# Patient Record
Sex: FEMALE | Race: WHITE | Employment: UNEMPLOYED | URBAN - METROPOLITAN AREA
[De-identification: names, ages, dates, MRNs, and addresses within clinical notes are randomized per-mention and may not be internally consistent; named-entity substitution may affect disease eponyms.]

---

## 2018-12-17 ENCOUNTER — HOSPITAL ENCOUNTER (EMERGENCY)
Facility: CLINIC | Age: 33
Discharge: HOME | End: 2018-12-17
Attending: EMERGENCY MEDICINE | Admitting: EMERGENCY MEDICINE

## 2018-12-17 ENCOUNTER — APPOINTMENT (OUTPATIENT)
Dept: GENERAL RADIOLOGY | Facility: CLINIC | Age: 33
End: 2018-12-17
Attending: EMERGENCY MEDICINE

## 2018-12-17 VITALS
OXYGEN SATURATION: 98 % | RESPIRATION RATE: 16 BRPM | WEIGHT: 293 LBS | SYSTOLIC BLOOD PRESSURE: 136 MMHG | TEMPERATURE: 99.1 F | DIASTOLIC BLOOD PRESSURE: 85 MMHG

## 2018-12-17 DIAGNOSIS — R07.9 CHEST PAIN, UNSPECIFIED TYPE: ICD-10-CM

## 2018-12-17 LAB
ANION GAP SERPL CALCULATED.3IONS-SCNC: 10 MMOL/L (ref 3–14)
B-HCG FREE SERPL-ACNC: <5 IU/L
BUN SERPL-MCNC: 9 MG/DL (ref 7–30)
CALCIUM SERPL-MCNC: 8.4 MG/DL (ref 8.5–10.1)
CHLORIDE SERPL-SCNC: 104 MMOL/L (ref 94–109)
CO2 SERPL-SCNC: 26 MMOL/L (ref 20–32)
CREAT SERPL-MCNC: 0.57 MG/DL (ref 0.52–1.04)
ERYTHROCYTE [DISTWIDTH] IN BLOOD BY AUTOMATED COUNT: 17 % (ref 10–15)
GFR SERPL CREATININE-BSD FRML MDRD: >90 ML/MIN/1.7M2
GLUCOSE SERPL-MCNC: 109 MG/DL (ref 70–99)
HCT VFR BLD AUTO: 43.4 % (ref 35–47)
HGB BLD-MCNC: 12.7 G/DL (ref 11.7–15.7)
MCH RBC QN AUTO: 24.7 PG (ref 26.5–33)
MCHC RBC AUTO-ENTMCNC: 29.3 G/DL (ref 31.5–36.5)
MCV RBC AUTO: 84 FL (ref 78–100)
PLATELET # BLD AUTO: 402 10E9/L (ref 150–450)
POTASSIUM SERPL-SCNC: 3.6 MMOL/L (ref 3.4–5.3)
RBC # BLD AUTO: 5.15 10E12/L (ref 3.8–5.2)
SODIUM SERPL-SCNC: 140 MMOL/L (ref 133–144)
TROPONIN I SERPL-MCNC: <0.015 UG/L (ref 0–0.04)
WBC # BLD AUTO: 13 10E9/L (ref 4–11)

## 2018-12-17 PROCEDURE — 85027 COMPLETE CBC AUTOMATED: CPT | Performed by: EMERGENCY MEDICINE

## 2018-12-17 PROCEDURE — 84702 CHORIONIC GONADOTROPIN TEST: CPT

## 2018-12-17 PROCEDURE — 93005 ELECTROCARDIOGRAM TRACING: CPT

## 2018-12-17 PROCEDURE — 25000128 H RX IP 250 OP 636: Performed by: EMERGENCY MEDICINE

## 2018-12-17 PROCEDURE — 96374 THER/PROPH/DIAG INJ IV PUSH: CPT

## 2018-12-17 PROCEDURE — 99285 EMERGENCY DEPT VISIT HI MDM: CPT | Mod: 25

## 2018-12-17 PROCEDURE — 84484 ASSAY OF TROPONIN QUANT: CPT | Performed by: EMERGENCY MEDICINE

## 2018-12-17 PROCEDURE — 80048 BASIC METABOLIC PNL TOTAL CA: CPT | Performed by: EMERGENCY MEDICINE

## 2018-12-17 PROCEDURE — 71046 X-RAY EXAM CHEST 2 VIEWS: CPT

## 2018-12-17 RX ORDER — TOPIRAMATE SPINKLE 25 MG/1
25 CAPSULE ORAL 2 TIMES DAILY
Status: ON HOLD | COMMUNITY
End: 2019-07-14

## 2018-12-17 RX ORDER — LORAZEPAM 2 MG/ML
1 INJECTION INTRAMUSCULAR ONCE
Status: COMPLETED | OUTPATIENT
Start: 2018-12-17 | End: 2018-12-17

## 2018-12-17 RX ORDER — LORAZEPAM 1 MG/1
0.5 TABLET ORAL EVERY 6 HOURS PRN
Qty: 6 TABLET | Refills: 0 | Status: ON HOLD | OUTPATIENT
Start: 2018-12-17 | End: 2019-07-14

## 2018-12-17 RX ADMIN — LORAZEPAM 1 MG: 2 INJECTION, SOLUTION INTRAMUSCULAR; INTRAVENOUS at 16:23

## 2018-12-17 SDOH — HEALTH STABILITY: MENTAL HEALTH: HOW OFTEN DO YOU HAVE A DRINK CONTAINING ALCOHOL?: NEVER

## 2018-12-17 ASSESSMENT — ENCOUNTER SYMPTOMS
VOMITING: 0
NAUSEA: 1
SHORTNESS OF BREATH: 1
NERVOUS/ANXIOUS: 1

## 2018-12-17 NOTE — ED TRIAGE NOTES
Patient to ED with c/o CP. Pain is sharp in nature and started at 0300 today. Patient reports that she had an urge to cry and felt anxious before she had the chest pain.  translation phone line used during triage to obtain information.

## 2018-12-17 NOTE — ED AVS SNAPSHOT
[facility] Emergency Department  [address]  [facility] [address]  Phone:  [phone]  Fax:  [phone]                                    Memo Cordova   MRN: [id number]    Department:  [facility] Emergency Department   Date of Visit:  12/17/2018           After Visit Summary Signature Page    I have received my discharge instructions, and my questions have been answered. I have discussed any challenges I see with this plan with the nurse or doctor.    ..........................................................................................................................................  Patient/Patient Representative Signature      ..........................................................................................................................................  Patient Representative Print Name and Relationship to Patient    ..................................................               ................................................  Date                                   Time    ..........................................................................................................................................  Reviewed by Signature/Title    ...................................................              ..............................................  Date                                               Time          22EPIC Rev 08/18

## 2018-12-17 NOTE — ED PROVIDER NOTES
History     Chief Complaint:  Chest Pain    HPI The history is obtained through the [place] language interpretation service.     Memo Cordova is a 33 year old female who presents for evaluation of chest pain. For about the last day, the patient has been feeling very anxious about some family issues and has been having episodes of crying due to her anxiety. This morning around 1000 shortly after having a crying spell, the patient started to develop sharp left-sided chest pain. This chest pain has been present constantly since onset and does not radiate elsewhere. She cannot identify any exacerbating or alleviating factors for this pain. Shortly after developing this chest pain, the patient felt somewhat nauseous and short of breath, but both these symptoms have since resolved, and she has not had any vomiting. Due to this episode of chest pain, the patient came into the ED for evaluation. Currently in the ED, the patient rates her pain at a severity of 6/10. She has not had any recent leg swelling, has no personal or family history of blood clots, and has not traveled or had surgery recently. She does not have a therapist or psychiatrist. She denies any suicidal ideation.     Cardiac Risk Factors:  CAD:    Negative   Hypertension:   Negative   Hyperlipidemia:  Negative   Diabetes:   Negative   Tobacco use:   Negative   Gender:   Female   Age:    33  Familial Hx of CAD:  Negative      PE/DVT Risk Factors:   Hx of PE/DVT:   Negative   Hx of clotting disorder:  Negative   Hx of cancer:    Negative   Tobacco use:    Negative   Hormone therapy:   Negative   Pregnancy:    Negative   Prolonged immobilization:  Negative   Recent surgery:   Negative   Recent travel:    Negative   Familial Hx of PE/DVT:  Negative      Allergies:  NKDA      Medications:    Topamax      Past Medical History:    Uncomplicated asthma     Past Surgical History:    Cholecystectomy     Family History:    History reviewed. No pertinent  family history.     Social History:  Tobacco use:    Never smoker   Alcohol use:    Negative   Marital status:       Accompanied to ED by:        Review of Systems   Respiratory: Positive for shortness of breath.    Cardiovascular: Positive for chest pain. Negative for leg swelling.   Gastrointestinal: Positive for nausea. Negative for vomiting.   Psychiatric/Behavioral: Negative for suicidal ideas. The patient is nervous/anxious.    All other systems reviewed and are negative.      Physical Exam   First Vitals:  BP: (!) 153/100  Heart Rate: 117  Temp: 99.1  F (37.3  C)  Resp: 18  Weight: 141.5 kg (312 lb)  SpO2: 100 %      Physical Exam    General:   Pleasant, age appropriate female.  HEENT:    Oropharynx is moist  Eyes:    Conjunctiva normal  Neck:    Supple, no meningismus.     CV:     Regular rate and rhythm.      No murmurs, rubs or gallops.       No unilateral leg swelling.       2+ radial pulses bilateral.       No lower extremity edema.  PULM:    Clear to auscultation bilateral.       No respiratory distress.      Good air exchange.     No rales or wheezing.  ABD:    Soft, non-tender, non-distended.       No pulsatile masses.       No rebound, guarding or rigidity.  MSK:     No gross deformity to all four extremities.   LYMPH:   No cervical lymphadenopathy.  NEURO:   Alert. Good muscle tone, no atrophy.  Skin:    Warm, dry and intact.    Psych:    Mildly anxious        Emergency Department Course   ECG (13:01:10):  Indication: Screening for cardiovascular disease.   Rate 113 bpm. NC interval 156 ms. QRS duration 80 ms. QT/QTc 338/463 ms. P-R-T axes 38 52 -24.   Interpretation: Sinus tachycardia, Nonspecific ST segment changes, Abnormal ECG   Agree with computer interpretation. Yes   Interpreted at 1610 by Dr. Muir.      Imaging:  Radiographic findings were communicated with the patient and family who voiced understanding of the findings.    Chest XR:  IMPRESSION: No acute cardiopulmonary  abnormalities.   Per radiology.     Laboratory:  CBC: WBC 13.0 high, o/w WNL (HGB 12.7, )  BMP: Glucose 109 high, Calcium 8.4 low, o/w WNL (Creatinine 0.57)  Troponin I 1613: <0.015   ISTAT HCG Quantitative Pregnancy POCT: <5.0     Interventions:  1623 Ativan 1 mg IV     Emergency Department Course:  Nursing notes and vitals reviewed.  1552: I performed an exam of the patient as documented above.     1723: I updated and reassessed the patient.     I personally reviewed the laboratory results with the patient and  and answered all related questions prior to discharge.     Findings and plan explained to the Patient and spouse. Patient discharged home with instructions regarding supportive care, medications, and reasons to return. The importance of close follow-up was reviewed. The patient was prescribed Ativan.      Impression & Plan      Medical Decision Makin-year-old female presented to the ED with increased anxiety, spontaneous crying and left-sided chest discomfort.  In regards to her chest pain, her EKG has nonspecific ST segment changes.  Her pain has been present for greater than 8 hours with a normal troponin thus ruling out myocardial infarction.  Her history is not suggestive of pericarditis, myocarditis, aortic dissection, aortic aneurysm.  She has no risk factors for pulmonary embolus.  Patient clearly has increased anxiety at home revolving around family issues.  Patient was given lorazepam with remarkable improvement of symptoms including the chest pain.  Differential diagnosis of her pain would include anxiety related somatoform disorder, costochondritis, pleurisy, atypical reflux, esophageal spasm.  Patient safe for discharge home with close follow-up primary care physician.  Limited supply of lorazepam as a rescue therapy.    Diagnosis:    ICD-10-CM   1. Chest pain, unspecified type R07.9       Disposition:  Discharged to home with Ativan.     Discharge Medications:  LORazepam 1  MG tablet  Commonly known as:  ATIVAN  0.5 mg, Oral, EVERY 6 HOURS PRN            I, Jake Leon, am serving as a scribe at 3:52 PM on 12/17/2018 to document services personally performed by Dr. Muir, based on my observations and the provider's statements to me.    [facility] EMERGENCY DEPARTMENT       Maverick Muir MD  12/17/18 2004

## 2018-12-18 LAB — INTERPRETATION ECG - MUSE: NORMAL

## 2019-04-03 ENCOUNTER — HOSPITAL ENCOUNTER (EMERGENCY)
Facility: CLINIC | Age: 34
Discharge: HOME | End: 2019-04-03
Attending: EMERGENCY MEDICINE | Admitting: EMERGENCY MEDICINE
Payer: MEDICAID

## 2019-04-03 VITALS
DIASTOLIC BLOOD PRESSURE: 89 MMHG | HEART RATE: 76 BPM | WEIGHT: 281.09 LBS | OXYGEN SATURATION: 100 % | SYSTOLIC BLOOD PRESSURE: 123 MMHG | TEMPERATURE: 96.8 F | RESPIRATION RATE: 22 BRPM

## 2019-04-03 DIAGNOSIS — R51.9 NONINTRACTABLE HEADACHE, UNSPECIFIED CHRONICITY PATTERN, UNSPECIFIED HEADACHE TYPE: ICD-10-CM

## 2019-04-03 LAB
ALBUMIN SERPL-MCNC: 3.3 G/DL (ref 3.4–5)
ALBUMIN UR-MCNC: NEGATIVE MG/DL
ALP SERPL-CCNC: 75 U/L (ref 40–150)
ALT SERPL W P-5'-P-CCNC: 30 U/L (ref 0–50)
ANION GAP SERPL CALCULATED.3IONS-SCNC: 3 MMOL/L (ref 3–14)
APPEARANCE UR: CLEAR
AST SERPL W P-5'-P-CCNC: 18 U/L (ref 0–45)
BASOPHILS # BLD AUTO: 0 10E9/L (ref 0–0.2)
BASOPHILS NFR BLD AUTO: 0.5 %
BILIRUB SERPL-MCNC: 0.4 MG/DL (ref 0.2–1.3)
BILIRUB UR QL STRIP: NEGATIVE
BUN SERPL-MCNC: 10 MG/DL (ref 7–30)
CALCIUM SERPL-MCNC: 8.6 MG/DL (ref 8.5–10.1)
CHLORIDE SERPL-SCNC: 106 MMOL/L (ref 94–109)
CO2 SERPL-SCNC: 30 MMOL/L (ref 20–32)
COLOR UR AUTO: ABNORMAL
CREAT SERPL-MCNC: 0.63 MG/DL (ref 0.52–1.04)
DIFFERENTIAL METHOD BLD: ABNORMAL
EOSINOPHIL # BLD AUTO: 0.1 10E9/L (ref 0–0.7)
EOSINOPHIL NFR BLD AUTO: 1.1 %
ERYTHROCYTE [DISTWIDTH] IN BLOOD BY AUTOMATED COUNT: 16.8 % (ref 10–15)
GFR SERPL CREATININE-BSD FRML MDRD: >90 ML/MIN/{1.73_M2}
GLUCOSE SERPL-MCNC: 98 MG/DL (ref 70–99)
GLUCOSE UR STRIP-MCNC: NEGATIVE MG/DL
HCG SERPL QL: NEGATIVE
HCT VFR BLD AUTO: 40.4 % (ref 35–47)
HGB BLD-MCNC: 12.3 G/DL (ref 11.7–15.7)
HGB UR QL STRIP: ABNORMAL
IMM GRANULOCYTES # BLD: 0 10E9/L (ref 0–0.4)
IMM GRANULOCYTES NFR BLD: 0.2 %
KETONES UR STRIP-MCNC: NEGATIVE MG/DL
LEUKOCYTE ESTERASE UR QL STRIP: NEGATIVE
LYMPHOCYTES # BLD AUTO: 2.1 10E9/L (ref 0.8–5.3)
LYMPHOCYTES NFR BLD AUTO: 32.8 %
MCH RBC QN AUTO: 26.6 PG (ref 26.5–33)
MCHC RBC AUTO-ENTMCNC: 30.4 G/DL (ref 31.5–36.5)
MCV RBC AUTO: 87 FL (ref 78–100)
MONOCYTES # BLD AUTO: 0.4 10E9/L (ref 0–1.3)
MONOCYTES NFR BLD AUTO: 6.8 %
MUCOUS THREADS #/AREA URNS LPF: PRESENT /LPF
NEUTROPHILS # BLD AUTO: 3.7 10E9/L (ref 1.6–8.3)
NEUTROPHILS NFR BLD AUTO: 58.6 %
NITRATE UR QL: NEGATIVE
NRBC # BLD AUTO: 0 10*3/UL
NRBC BLD AUTO-RTO: 0 /100
PH UR STRIP: 6.5 PH (ref 5–7)
PLATELET # BLD AUTO: 337 10E9/L (ref 150–450)
POTASSIUM SERPL-SCNC: 3.9 MMOL/L (ref 3.4–5.3)
PROT SERPL-MCNC: 7.5 G/DL (ref 6.8–8.8)
RBC # BLD AUTO: 4.62 10E12/L (ref 3.8–5.2)
RBC #/AREA URNS AUTO: 10 /HPF (ref 0–2)
SODIUM SERPL-SCNC: 139 MMOL/L (ref 133–144)
SOURCE: ABNORMAL
SP GR UR STRIP: 1.01 (ref 1–1.03)
SQUAMOUS #/AREA URNS AUTO: 1 /HPF (ref 0–1)
UROBILINOGEN UR STRIP-MCNC: NORMAL MG/DL (ref 0–2)
WBC # BLD AUTO: 6.4 10E9/L (ref 4–11)
WBC #/AREA URNS AUTO: 1 /HPF (ref 0–5)

## 2019-04-03 PROCEDURE — 25000128 H RX IP 250 OP 636: Performed by: EMERGENCY MEDICINE

## 2019-04-03 PROCEDURE — 96374 THER/PROPH/DIAG INJ IV PUSH: CPT

## 2019-04-03 PROCEDURE — 84703 CHORIONIC GONADOTROPIN ASSAY: CPT | Performed by: EMERGENCY MEDICINE

## 2019-04-03 PROCEDURE — 96375 TX/PRO/DX INJ NEW DRUG ADDON: CPT

## 2019-04-03 PROCEDURE — 85025 COMPLETE CBC W/AUTO DIFF WBC: CPT | Performed by: EMERGENCY MEDICINE

## 2019-04-03 PROCEDURE — 96361 HYDRATE IV INFUSION ADD-ON: CPT

## 2019-04-03 PROCEDURE — 80053 COMPREHEN METABOLIC PANEL: CPT | Performed by: EMERGENCY MEDICINE

## 2019-04-03 PROCEDURE — 99285 EMERGENCY DEPT VISIT HI MDM: CPT | Mod: 25

## 2019-04-03 PROCEDURE — 81001 URINALYSIS AUTO W/SCOPE: CPT | Performed by: EMERGENCY MEDICINE

## 2019-04-03 PROCEDURE — 80201 ASSAY OF TOPIRAMATE: CPT | Performed by: EMERGENCY MEDICINE

## 2019-04-03 RX ORDER — LORAZEPAM 2 MG/ML
1 INJECTION INTRAMUSCULAR ONCE
Status: DISCONTINUED | OUTPATIENT
Start: 2019-04-03 | End: 2019-04-03 | Stop reason: HOSPADM

## 2019-04-03 RX ORDER — DIPHENHYDRAMINE HYDROCHLORIDE 50 MG/ML
25 INJECTION INTRAMUSCULAR; INTRAVENOUS ONCE
Status: COMPLETED | OUTPATIENT
Start: 2019-04-03 | End: 2019-04-03

## 2019-04-03 RX ORDER — DEXAMETHASONE SODIUM PHOSPHATE 10 MG/ML
10 INJECTION, SOLUTION INTRAMUSCULAR; INTRAVENOUS ONCE
Status: COMPLETED | OUTPATIENT
Start: 2019-04-03 | End: 2019-04-03

## 2019-04-03 RX ORDER — KETOROLAC TROMETHAMINE 15 MG/ML
15 INJECTION, SOLUTION INTRAMUSCULAR; INTRAVENOUS ONCE
Status: COMPLETED | OUTPATIENT
Start: 2019-04-03 | End: 2019-04-03

## 2019-04-03 RX ORDER — HYDROMORPHONE HYDROCHLORIDE 1 MG/ML
0.5 INJECTION, SOLUTION INTRAMUSCULAR; INTRAVENOUS; SUBCUTANEOUS ONCE
Status: DISCONTINUED | OUTPATIENT
Start: 2019-04-03 | End: 2019-04-03 | Stop reason: HOSPADM

## 2019-04-03 RX ORDER — HYDROCODONE BITARTRATE AND ACETAMINOPHEN 5; 325 MG/1; MG/1
1 TABLET ORAL EVERY 6 HOURS PRN
Qty: 18 TABLET | Refills: 0 | Status: ON HOLD | OUTPATIENT
Start: 2019-04-03 | End: 2019-07-14

## 2019-04-03 RX ORDER — METOCLOPRAMIDE HYDROCHLORIDE 5 MG/ML
10 INJECTION INTRAMUSCULAR; INTRAVENOUS ONCE
Status: COMPLETED | OUTPATIENT
Start: 2019-04-03 | End: 2019-04-03

## 2019-04-03 RX ORDER — RIZATRIPTAN BENZOATE 5 MG/1
5 TABLET ORAL
Qty: 20 TABLET | Refills: 0 | Status: ON HOLD | OUTPATIENT
Start: 2019-04-03 | End: 2019-07-14

## 2019-04-03 RX ADMIN — DIPHENHYDRAMINE HYDROCHLORIDE 25 MG: 50 INJECTION, SOLUTION INTRAMUSCULAR; INTRAVENOUS at 10:37

## 2019-04-03 RX ADMIN — DEXAMETHASONE SODIUM PHOSPHATE 10 MG: 10 INJECTION, SOLUTION INTRAMUSCULAR; INTRAVENOUS at 10:26

## 2019-04-03 RX ADMIN — SODIUM CHLORIDE 1000 ML: 9 INJECTION, SOLUTION INTRAVENOUS at 10:12

## 2019-04-03 RX ADMIN — KETOROLAC TROMETHAMINE 15 MG: 15 INJECTION, SOLUTION INTRAMUSCULAR; INTRAVENOUS at 11:22

## 2019-04-03 RX ADMIN — DIPHENHYDRAMINE HYDROCHLORIDE 25 MG: 50 INJECTION, SOLUTION INTRAMUSCULAR; INTRAVENOUS at 10:18

## 2019-04-03 RX ADMIN — METOCLOPRAMIDE 10 MG: 5 INJECTION, SOLUTION INTRAMUSCULAR; INTRAVENOUS at 10:20

## 2019-04-03 ASSESSMENT — ENCOUNTER SYMPTOMS
VOMITING: 1
NAUSEA: 1
NUMBNESS: 0
HEADACHES: 1
PHOTOPHOBIA: 1
WEAKNESS: 0
SHORTNESS OF BREATH: 0

## 2019-04-03 NOTE — ED PROVIDER NOTES
History     Chief Complaint:  Headache    HPI   HPI limited secondary to language barrier. HPI provided by patient and supplemented by friend plus     Memo Cordova is a 34 year old female, with a history of asthma and migraines, who presents with her friend to the emergency department for evaluation of a headache. The patient reports she started experiencing a frontal headache around 0800 with associated nausea, vomiting, and photophobia. She denies any weakness, chest pain, shortness of breath, or numbness. She notes she took Rizatriptan Benzoate for her headache, but this did not relieve it. She reports she gets headaches similar to this once a month.  No vision changes.  No numbness in the fingers or weakness of the hands.  Not the worst ever headache.  Phono and photophobia.  No fevers or neck pain.    Allergies:  No known drug allergies     Medications:    Topamax  Rizatriptan Benzoate    Past Medical History:    Asthma  Migraines  PCOS  Hemorrhoids  Carpal tunnel syndrome    Past Surgical History:    Cholecystectomy    Family History:    History reviewed. No pertinent family history.     Social History:  Smoking status: Never  Alcohol use: No  The patient presents to the emergency department with her friend.  Marital Status:   [2]     Review of Systems   Eyes: Positive for photophobia.   Respiratory: Negative for shortness of breath.    Cardiovascular: Negative for chest pain.   Gastrointestinal: Positive for nausea and vomiting.   Neurological: Positive for headaches. Negative for weakness and numbness.   All other systems reviewed and are negative.    Faroese speaking patient presents with headache that started today at 0800 and took Rizatriptan Benzoate for headache which is not helping.     Physical Exam   Patient Vitals for the past 24 hrs:   BP Temp Temp src Pulse Resp SpO2 Weight   04/03/19 1241 123/89 -- -- 76 -- 100 % --   04/03/19 1230 105/68 -- -- 68 -- 100 % --    04/03/19 1200 111/62 -- -- 70 -- 100 % --   04/03/19 1130 111/72 -- -- 79 -- 100 % --   04/03/19 1125 -- -- -- -- -- 100 % --   04/03/19 1045 (!) 135/104 -- -- 89 -- 100 % --   04/03/19 1030 (!) 130/105 -- -- 83 -- 100 % --   04/03/19 0942 (!) 148/103 96.8  F (36  C) Temporal 81 22 100 % 127.5 kg (281 lb 1.4 oz)     Physical Exam  GEN: patient appears tired, friend at the bedside  HEAD: atraumatic, normocephalic, no temple tenderness  EYES: pupils reactive (3plus to 2plus), extraocular muscles intact, conjunctivae normal  ENT: TMs flat and white bilaterally, oropharynx normal with no erythema or exudate, mucus membranes dry  NECK: no cervical LAD, no meningeal signs  RESPIRATORY: no tachypnea, breath sounds clear to auscultation (no rales, wheezes, rhonchi), chest wall nontender, normal phonation  CVS: normal S1/S2, no murmurs/rubs/gallops  ABDOMEN: soft, nontender, no masses or organomegaly, no rebound, positive bowel sounds  BACK: no costovertebral angle tenderness  EXTREMITIES: intact pulses x 4, full range of motion at joints, no edema  MUSCULOSKELETAL: no deformities  SKIN: warm and dry, no acute rashes   NEURO: GCS 15, cranial nerves intact.  Motor- moves all 4 extremities with 5/5 strength,  5/5.  DF and Pf 5/5.  Sensation- intact all dermatones to pinprick and light touch.  Reflexes- DTRs 2plus.  Coordination- ambulatory.  Overall symmetrical exam  HEME: no bruising or petechiae/contusions  LYMPH: no lymphadenopathy    Emergency Department Course   Laboratory:  UA: Blood (Moderate), RBC (10), Mucous (Present) o/w Negative    CBC: WNL (WBC 6.4, HGB 12.3, )  CMP: Albumin 3.3 (L) o/w WNL (Creatinine 0.63)  HCG pregnancy: Negative  Topiramate level: pending    Interventions:  1012 NS 1L IV Bolus  1018 Benadryl 25 mg IV  1020 Reglan 10 mg IV  1026 Decadron 10 mg IV  1037 Benadryl 25 mg IV  1122 Toradol 15 mg IV  Heplock  Cardiac/Sp02 monitoring    Emergency Department Course:  Past medical records,  nursing notes, and vitals reviewed.  1008: I performed an exam of the patient and obtained history, as documented above.    IV inserted and blood drawn.     1248: I rechecked the patient. Explained findings to the patient.  /89   Pulse 76   Temp 96.8  F (36  C) (Temporal)   Resp 22   Wt 127.5 kg (281 lb 1.4 oz)   SpO2 100%     Findings and plan explained to the Patient. Patient discharged home with instructions regarding supportive care, medications, and reasons to return. The importance of close follow-up was reviewed.     /89   Pulse 76   Temp 96.8  F (36  C) (Temporal)   Resp 22   Wt 127.5 kg (281 lb 1.4 oz)   SpO2 100%   Patient improved.    Discussed results with patient.  Gave patient copies of all results (applicable labs, CT scans and/or ultrasounds).  Answered questions.  Asked patient to followup with PCP.  Circled any abnormal lab values and asked patient to followup with PCP.    Impression & Plan    Medical Decision Making:  Memo Cordova is a 34 year old female, with a history of asthma and migraines, who presents with her friend to the emergency department for evaluation of a headache.  Patient was evaluated in the ED today for acute cephalgia. We considered the following underlying pathology for the patient's headache today:     Subarachnoid hemorrhage: Patient did not have an abrupt onset of their headache and was not considered the worst of their life. There is no known  history of intra-cranial aneurysm, neck pain, neurologic impairment and no  recent syncope or seizure activity. Otherwise the history is  not suggestive of SAH and thus not pursued in the ED today with CT and lumbar puncture.     Acute angle closure glaucoma:  Patient has no significant eye pain or vision loss.  The pupils are reactive and equal with no evidence of corneal clouding.     Temporal arteritis:  No history of polymyalgia rheumatica, jaw claudication and non-tender temporal artery on  examination.     Carbon monoxide toxicity:  There is no relationship to the patient's headache and confinement to a certain environment. There are no other contacts at this  time will similar or worsening headache.     Intracranial mass: Patient does not have a temporal relationship the headaches with worsening in the morning. The neurologic exam is without impairment and  The patient has no history of active malignancy.     Meningitis: Patient has no neck pain, fever, meningismus or exposure to contacts with meningitis.     IV placed and labs sent.  CBC and chemistry panel is normal.  hcg negative.  Topomax level pending.  UA with no infection.    It appears the patient's headache is most suggestive of recurrent migraine. Headache improved the ED with the interventions as described above and will be sent home with anti-emetics and pain medicine. Patient is encouraged to follow up with their primary physician for further management of the headache including prevention. Patient is counseled to return to the ED immediately for fever, vision changes, numbness, weakness, neck pain, failure to improve, worsening headache or any other concerns.    Diagnosis:    ICD-10-CM   1. Nonintractable headache, unspecified chronicity pattern, unspecified headache type R51     Disposition:  Discharged to home with instructions for follow up.  Discharge Medications:  Started    HYDROcodone-acetaminophen 5-325 MG tablet  Commonly known as:  NORCO  1 tablet, Oral, EVERY 6 HOURS PRN     rizatriptan 5 MG tablet  Commonly known as:  MAXALT  5 mg, Oral, AT ONSET OF HEADACHE       Julissa Greenwood  4/3/2019   [facility] EMERGENCY DEPARTMENT  Scribe Disclosure:  Julissa LUO, am serving as a scribe at 10:08 AM on 4/3/2019 to document services personally performed by Laisha Archer MD based on my observations and the provider's statements to me.      Laisha Archer MD  04/04/19 1163

## 2019-04-03 NOTE — ED TRIAGE NOTES
Zambian speaking patient presents with headache that started today at 0800 and took Rizatriptan Benzoate for headache which is not helping. ABC's intact.

## 2019-04-03 NOTE — ED AVS SNAPSHOT
[facility] Emergency Department  [address]  [facility] [address]  Phone:  [phone]  Fax:  [phone]                                    Memo Cordova   MRN: [id number]    Department:  [facility] Emergency Department   Date of Visit:  4/3/2019           After Visit Summary Signature Page    I have received my discharge instructions, and my questions have been answered. I have discussed any challenges I see with this plan with the nurse or doctor.    ..........................................................................................................................................  Patient/Patient Representative Signature      ..........................................................................................................................................  Patient Representative Print Name and Relationship to Patient    ..................................................               ................................................  Date                                   Time    ..........................................................................................................................................  Reviewed by Signature/Title    ...................................................              ..............................................  Date                                               Time          22EPIC Rev 08/18

## 2019-04-04 LAB — TOPIRAMATE SERPL-MCNC: <1.5 UG/ML (ref 5–20)

## 2019-07-14 ENCOUNTER — APPOINTMENT (OUTPATIENT)
Dept: ULTRASOUND IMAGING | Facility: CLINIC | Age: 34
End: 2019-07-14
Attending: EMERGENCY MEDICINE

## 2019-07-14 ENCOUNTER — APPOINTMENT (OUTPATIENT)
Dept: CT IMAGING | Facility: CLINIC | Age: 34
End: 2019-07-14
Attending: EMERGENCY MEDICINE

## 2019-07-14 ENCOUNTER — HOSPITAL ENCOUNTER (OUTPATIENT)
Facility: CLINIC | Age: 34
Setting detail: OBSERVATION
Discharge: HOME | End: 2019-07-14
Attending: EMERGENCY MEDICINE | Admitting: INTERNAL MEDICINE

## 2019-07-14 VITALS
RESPIRATION RATE: 16 BRPM | SYSTOLIC BLOOD PRESSURE: 137 MMHG | DIASTOLIC BLOOD PRESSURE: 79 MMHG | TEMPERATURE: 98.2 F | HEART RATE: 110 BPM | OXYGEN SATURATION: 100 % | WEIGHT: 263.7 LBS

## 2019-07-14 DIAGNOSIS — R42 DIZZINESS: Primary | ICD-10-CM

## 2019-07-14 DIAGNOSIS — R42 VERTIGO: ICD-10-CM

## 2019-07-14 PROBLEM — E86.0 DEHYDRATION: Status: ACTIVE | Noted: 2019-07-14

## 2019-07-14 LAB
ANION GAP SERPL CALCULATED.3IONS-SCNC: 6 MMOL/L (ref 3–14)
BASOPHILS # BLD AUTO: 0 10E9/L (ref 0–0.2)
BASOPHILS NFR BLD AUTO: 0.3 %
BUN SERPL-MCNC: 15 MG/DL (ref 7–30)
CALCIUM SERPL-MCNC: 8.3 MG/DL (ref 8.5–10.1)
CHLORIDE SERPL-SCNC: 107 MMOL/L (ref 94–109)
CO2 SERPL-SCNC: 25 MMOL/L (ref 20–32)
CREAT SERPL-MCNC: 0.58 MG/DL (ref 0.52–1.04)
D DIMER PPP FEU-MCNC: 0.8 UG/ML FEU (ref 0–0.5)
DIFFERENTIAL METHOD BLD: ABNORMAL
EOSINOPHIL # BLD AUTO: 0.1 10E9/L (ref 0–0.7)
EOSINOPHIL NFR BLD AUTO: 1 %
ERYTHROCYTE [DISTWIDTH] IN BLOOD BY AUTOMATED COUNT: 18 % (ref 10–15)
GFR SERPL CREATININE-BSD FRML MDRD: >90 ML/MIN/{1.73_M2}
GLUCOSE SERPL-MCNC: 113 MG/DL (ref 70–99)
HCT VFR BLD AUTO: 30.9 % (ref 35–47)
HGB BLD-MCNC: 9 G/DL (ref 11.7–15.7)
IMM GRANULOCYTES # BLD: 0 10E9/L (ref 0–0.4)
IMM GRANULOCYTES NFR BLD: 0.3 %
LYMPHOCYTES # BLD AUTO: 3.5 10E9/L (ref 0.8–5.3)
LYMPHOCYTES NFR BLD AUTO: 38.3 %
MCH RBC QN AUTO: 21.8 PG (ref 26.5–33)
MCHC RBC AUTO-ENTMCNC: 29.1 G/DL (ref 31.5–36.5)
MCV RBC AUTO: 75 FL (ref 78–100)
MONOCYTES # BLD AUTO: 0.6 10E9/L (ref 0–1.3)
MONOCYTES NFR BLD AUTO: 6.9 %
NEUTROPHILS # BLD AUTO: 4.9 10E9/L (ref 1.6–8.3)
NEUTROPHILS NFR BLD AUTO: 53.2 %
NRBC # BLD AUTO: 0 10*3/UL
NRBC BLD AUTO-RTO: 0 /100
PLATELET # BLD AUTO: 416 10E9/L (ref 150–450)
POTASSIUM SERPL-SCNC: 3.1 MMOL/L (ref 3.4–5.3)
RBC # BLD AUTO: 4.13 10E12/L (ref 3.8–5.2)
SODIUM SERPL-SCNC: 138 MMOL/L (ref 133–144)
TROPONIN I SERPL-MCNC: <0.015 UG/L (ref 0–0.04)
WBC # BLD AUTO: 9.1 10E9/L (ref 4–11)

## 2019-07-14 PROCEDURE — 96374 THER/PROPH/DIAG INJ IV PUSH: CPT | Mod: 59

## 2019-07-14 PROCEDURE — 99291 CRITICAL CARE FIRST HOUR: CPT | Mod: 25

## 2019-07-14 PROCEDURE — 96361 HYDRATE IV INFUSION ADD-ON: CPT

## 2019-07-14 PROCEDURE — 97112 NEUROMUSCULAR REEDUCATION: CPT | Mod: GP | Performed by: PHYSICAL THERAPIST

## 2019-07-14 PROCEDURE — G0378 HOSPITAL OBSERVATION PER HR: HCPCS

## 2019-07-14 PROCEDURE — 99219 ZZC INITIAL OBSERVATION CARE,LEVL II: CPT | Performed by: INTERNAL MEDICINE

## 2019-07-14 PROCEDURE — 80048 BASIC METABOLIC PNL TOTAL CA: CPT | Performed by: EMERGENCY MEDICINE

## 2019-07-14 PROCEDURE — 93005 ELECTROCARDIOGRAM TRACING: CPT

## 2019-07-14 PROCEDURE — 71260 CT THORAX DX C+: CPT

## 2019-07-14 PROCEDURE — 25000132 ZZH RX MED GY IP 250 OP 250 PS 637: Performed by: INTERNAL MEDICINE

## 2019-07-14 PROCEDURE — 25000128 H RX IP 250 OP 636: Performed by: PHYSICIAN ASSISTANT

## 2019-07-14 PROCEDURE — 85379 FIBRIN DEGRADATION QUANT: CPT | Performed by: EMERGENCY MEDICINE

## 2019-07-14 PROCEDURE — 25000128 H RX IP 250 OP 636: Performed by: EMERGENCY MEDICINE

## 2019-07-14 PROCEDURE — 85025 COMPLETE CBC W/AUTO DIFF WBC: CPT | Performed by: EMERGENCY MEDICINE

## 2019-07-14 PROCEDURE — 93970 EXTREMITY STUDY: CPT

## 2019-07-14 PROCEDURE — 70498 CT ANGIOGRAPHY NECK: CPT

## 2019-07-14 PROCEDURE — 25000132 ZZH RX MED GY IP 250 OP 250 PS 637: Performed by: EMERGENCY MEDICINE

## 2019-07-14 PROCEDURE — 96375 TX/PRO/DX INJ NEW DRUG ADDON: CPT

## 2019-07-14 PROCEDURE — 70450 CT HEAD/BRAIN W/O DYE: CPT

## 2019-07-14 PROCEDURE — 96376 TX/PRO/DX INJ SAME DRUG ADON: CPT

## 2019-07-14 PROCEDURE — 97161 PT EVAL LOW COMPLEX 20 MIN: CPT | Mod: GP | Performed by: PHYSICAL THERAPIST

## 2019-07-14 PROCEDURE — 84484 ASSAY OF TROPONIN QUANT: CPT | Performed by: EMERGENCY MEDICINE

## 2019-07-14 PROCEDURE — 99207 ZZC APP CREDIT; MD BILLING SHARED VISIT: CPT | Performed by: PHYSICIAN ASSISTANT

## 2019-07-14 PROCEDURE — 25000128 H RX IP 250 OP 636: Performed by: INTERNAL MEDICINE

## 2019-07-14 RX ORDER — MECLIZINE HYDROCHLORIDE 25 MG/1
25 TABLET ORAL 3 TIMES DAILY PRN
Qty: 20 TABLET | Refills: 0 | Status: SHIPPED | OUTPATIENT
Start: 2019-07-14

## 2019-07-14 RX ORDER — ACETAMINOPHEN 325 MG/1
650 TABLET ORAL EVERY 4 HOURS PRN
Status: DISCONTINUED | OUTPATIENT
Start: 2019-07-14 | End: 2019-07-14 | Stop reason: HOSPADM

## 2019-07-14 RX ORDER — RIZATRIPTAN BENZOATE 5 MG/1
5 TABLET, ORALLY DISINTEGRATING ORAL
Status: DISCONTINUED | OUTPATIENT
Start: 2019-07-14 | End: 2019-07-14 | Stop reason: HOSPADM

## 2019-07-14 RX ORDER — DEXAMETHASONE SODIUM PHOSPHATE 10 MG/ML
10 INJECTION, SOLUTION INTRAMUSCULAR; INTRAVENOUS ONCE
Status: COMPLETED | OUTPATIENT
Start: 2019-07-14 | End: 2019-07-14

## 2019-07-14 RX ORDER — TOPIRAMATE SPINKLE 25 MG/1
25 CAPSULE ORAL 2 TIMES DAILY
Status: DISCONTINUED | OUTPATIENT
Start: 2019-07-14 | End: 2019-07-14

## 2019-07-14 RX ORDER — NALOXONE HYDROCHLORIDE 0.4 MG/ML
.1-.4 INJECTION, SOLUTION INTRAMUSCULAR; INTRAVENOUS; SUBCUTANEOUS
Status: DISCONTINUED | OUTPATIENT
Start: 2019-07-14 | End: 2019-07-14 | Stop reason: HOSPADM

## 2019-07-14 RX ORDER — NORTRIPTYLINE HYDROCHLORIDE 75 MG/1
75 CAPSULE ORAL AT BEDTIME
COMMUNITY

## 2019-07-14 RX ORDER — MECLIZINE HYDROCHLORIDE 25 MG/1
25 TABLET ORAL ONCE
Status: COMPLETED | OUTPATIENT
Start: 2019-07-14 | End: 2019-07-14

## 2019-07-14 RX ORDER — ONDANSETRON 2 MG/ML
4 INJECTION INTRAMUSCULAR; INTRAVENOUS EVERY 6 HOURS PRN
Status: DISCONTINUED | OUTPATIENT
Start: 2019-07-14 | End: 2019-07-14 | Stop reason: HOSPADM

## 2019-07-14 RX ORDER — MECLIZINE HYDROCHLORIDE 25 MG/1
25 TABLET ORAL EVERY 8 HOURS SCHEDULED
Status: DISCONTINUED | OUTPATIENT
Start: 2019-07-14 | End: 2019-07-14 | Stop reason: HOSPADM

## 2019-07-14 RX ORDER — PROCHLORPERAZINE MALEATE 10 MG
10 TABLET ORAL EVERY 6 HOURS PRN
Status: DISCONTINUED | OUTPATIENT
Start: 2019-07-14 | End: 2019-07-14 | Stop reason: HOSPADM

## 2019-07-14 RX ORDER — ACETAMINOPHEN 650 MG/1
650 SUPPOSITORY RECTAL EVERY 4 HOURS PRN
Status: DISCONTINUED | OUTPATIENT
Start: 2019-07-14 | End: 2019-07-14 | Stop reason: HOSPADM

## 2019-07-14 RX ORDER — DIAZEPAM 10 MG/2ML
2.5 INJECTION, SOLUTION INTRAMUSCULAR; INTRAVENOUS ONCE
Status: COMPLETED | OUTPATIENT
Start: 2019-07-14 | End: 2019-07-14

## 2019-07-14 RX ORDER — SODIUM CHLORIDE AND POTASSIUM CHLORIDE 150; 900 MG/100ML; MG/100ML
INJECTION, SOLUTION INTRAVENOUS CONTINUOUS
Status: DISCONTINUED | OUTPATIENT
Start: 2019-07-14 | End: 2019-07-14 | Stop reason: HOSPADM

## 2019-07-14 RX ORDER — DIPHENHYDRAMINE HYDROCHLORIDE 50 MG/ML
25 INJECTION INTRAMUSCULAR; INTRAVENOUS ONCE
Status: COMPLETED | OUTPATIENT
Start: 2019-07-14 | End: 2019-07-14

## 2019-07-14 RX ORDER — PROCHLORPERAZINE 25 MG
25 SUPPOSITORY, RECTAL RECTAL EVERY 12 HOURS PRN
Status: DISCONTINUED | OUTPATIENT
Start: 2019-07-14 | End: 2019-07-14 | Stop reason: HOSPADM

## 2019-07-14 RX ORDER — IOPAMIDOL 755 MG/ML
500 INJECTION, SOLUTION INTRAVASCULAR ONCE
Status: COMPLETED | OUTPATIENT
Start: 2019-07-14 | End: 2019-07-14

## 2019-07-14 RX ORDER — LORAZEPAM 0.5 MG/1
0.5 TABLET ORAL EVERY 6 HOURS PRN
Status: DISCONTINUED | OUTPATIENT
Start: 2019-07-14 | End: 2019-07-14 | Stop reason: HOSPADM

## 2019-07-14 RX ORDER — ONDANSETRON 4 MG/1
4 TABLET, ORALLY DISINTEGRATING ORAL EVERY 6 HOURS PRN
Status: DISCONTINUED | OUTPATIENT
Start: 2019-07-14 | End: 2019-07-14 | Stop reason: HOSPADM

## 2019-07-14 RX ADMIN — DIPHENHYDRAMINE HYDROCHLORIDE 25 MG: 50 INJECTION, SOLUTION INTRAMUSCULAR; INTRAVENOUS at 02:06

## 2019-07-14 RX ADMIN — DIAZEPAM 2.5 MG: 5 INJECTION, SOLUTION INTRAMUSCULAR; INTRAVENOUS at 04:04

## 2019-07-14 RX ADMIN — IOPAMIDOL 140 ML: 755 INJECTION, SOLUTION INTRAVENOUS at 01:49

## 2019-07-14 RX ADMIN — SODIUM CHLORIDE 1000 ML: 9 INJECTION, SOLUTION INTRAVENOUS at 04:30

## 2019-07-14 RX ADMIN — SODIUM CHLORIDE 80 ML: 9 INJECTION, SOLUTION INTRAVENOUS at 01:32

## 2019-07-14 RX ADMIN — PROCHLORPERAZINE EDISYLATE 10 MG: 5 INJECTION INTRAMUSCULAR; INTRAVENOUS at 02:05

## 2019-07-14 RX ADMIN — IOPAMIDOL 120 ML: 755 INJECTION, SOLUTION INTRAVENOUS at 01:32

## 2019-07-14 RX ADMIN — POTASSIUM CHLORIDE AND SODIUM CHLORIDE: 900; 150 INJECTION, SOLUTION INTRAVENOUS at 09:36

## 2019-07-14 RX ADMIN — MECLIZINE HYDROCHLORIDE 25 MG: 25 TABLET ORAL at 09:36

## 2019-07-14 RX ADMIN — MECLIZINE HYDROCHLORIDE 25 MG: 25 TABLET ORAL at 06:35

## 2019-07-14 RX ADMIN — MECLIZINE HYDROCHLORIDE 25 MG: 25 TABLET ORAL at 16:35

## 2019-07-14 RX ADMIN — SODIUM CHLORIDE 1000 ML: 9 INJECTION, SOLUTION INTRAVENOUS at 06:35

## 2019-07-14 RX ADMIN — DEXAMETHASONE SODIUM PHOSPHATE 10 MG: 10 INJECTION, SOLUTION INTRAMUSCULAR; INTRAVENOUS at 06:35

## 2019-07-14 RX ADMIN — Medication 2.5 MG: at 01:53

## 2019-07-14 ASSESSMENT — ENCOUNTER SYMPTOMS
DIZZINESS: 1
ABDOMINAL PAIN: 1
SHORTNESS OF BREATH: 1
NUMBNESS: 1
COUGH: 1
NAUSEA: 1

## 2019-07-14 NOTE — LETTER
July 14, 2019      eMmo Cordova  [address] [facility] [address]        To Whom It May Concern:    Memo Cordova was seen on 7/14/19.  Please excuse her until 7/15/19 due to illness.        Sincerely,      Kathleen Wilson PA-C

## 2019-07-14 NOTE — PLAN OF CARE
ROOM # 230    [facility]if not independent, order [facility] consult[facility]  Facility name:  : [facility]     Activity level at baseline: Indep  Activity level on admit: SBA      Patient registered to observation; given Patient Bill of Rights; given the opportunity to ask questions about observation status and their plan of care.  Patient has been oriented to the observation room, bathroom and call light is in place.    Discussed discharge goals and expectations with patient/family.         OBSERVATION patient IN TIME: 0800

## 2019-07-14 NOTE — ED PROVIDER NOTES
History     Chief Complaint:  Chest Pain      The history is limited by a language barrier. A  was used.      Memo Cordova is a 34 year old female with a history of asthma and migraines who presents with chest pain and dizziness that both began at 11pm last night. Currently she feels worse than then at that time and also reports shortness of breath, nausea, burning upper abdominal pain, and numbness in both her feet. She has never had an episode of this dizziness before and feels like the room is spinning and she feels like she is going to faint. Nurse reports that she endorsed a productive cough. She denies any recent travel or nay sick contacts at home. The patient had a recent brain MRI on 6/9, finding listed below.    MRI brain w/o contrast  Impression:   There are a few nonspecific foci of T2-hyperintensity in the bifrontal white matter, perhaps minimally more than expected for age; these may represent the sequelae of migraine headaches. Otherwise unremarkable appearance of the intracranial contents.    Nonspecific inflammatory changes in the maxillary sinuses and mild fluid in the mastoid air cells. Prominent adenoid tonsils may be reactive.    Allergies:  No known drug allergies    Medications:    Maxalt  Topamax  Nortriptyline    Past Medical History:    Asthma  Migraines    Past Surgical History:    cholecystectomy    Family History:    History reviewed. No pertinent family history.     Social History:  Smoking status: never  Alcohol use: no  Marital Status:   [2]       Review of Systems   Respiratory: Positive for cough and shortness of breath.    Cardiovascular: Positive for chest pain and leg swelling.   Gastrointestinal: Positive for abdominal pain and nausea.   Neurological: Positive for dizziness and numbness.   All other systems reviewed and are negative.    Physical Exam     Patient Vitals for the past 24 hrs:   BP Temp Temp src Pulse Heart Rate Resp SpO2    07/14/19 0600 (!) 146/101 -- -- 106 113 20 99 %   07/14/19 0530 (!) 152/103 -- -- 110 -- 20 99 %   07/14/19 0500 (!) 137/92 -- -- 107 110 (!) 32 97 %   07/14/19 0430 (!) 139/91 -- -- 109 108 (!) 37 100 %   07/14/19 0235 -- -- -- -- -- -- 100 %   07/14/19 0234 -- -- -- -- -- -- 100 %   07/14/19 0034 113/72 97.3  F (36.3  C) Temporal 86 -- 18 99 %       Physical Exam  Constitutional: Vital signs reviewed as above.   HENT:               Head: No external signs of trauma noted.              Eyes: Pupils are equal, round, and reactive to light.    Ears: Normal bilateral tympanic membranes and external auditory canals.  Cardiovascular: Normal rate, regular rhythm, normal heart sounds and intact distal pulses.    Pulmonary/Chest: Effort normal and breath sounds normal. No respiratory distress. No wheezes noted.   Gastrointestinal: Soft. There is no tenderness. There is no rebound.   Musculoskeletal:              No deformities appreciated              No edema noted  Neurological:               Patient is alert and oriented to person, place, and time.               Speech is fluent, cognition is normal.              CN 2-12 intact (PERRL, EOMI, symmetric smile, equal eye squeeze and forehead raise, normal and equal sensation to bilateral forehead/cheek/chin, equal hearing to finger rub, midline tongue protrusion with nl side-to-side movement, normal shoulder shrug).               RUE strength 5/5: , finger abd, wrist flex/ext, elbow flex/ext.               LUE strength 5/5: , finger abd, wrist flex/ext, elbow flex/ext.               RLE strength 5/5: ankle flex/ext, knee flex/ext, hip flex.               LLE strength 5/5: ankle flex/ext, knee flex/ext, hip flex.               Sensation equal in all 4 extremities.               No arm drift.                Cerebellar: B/L heel-to-shin was completed though the patient notes that makes her feel dizzy.  I tried doing finger-to-nose testing but the patient got very  dizzy even with right sided finger-to-nose testing and we could not complete left-sided testing.  Rapid pronation and supination as well as rolling hands forward and backwards seemed symmetric and without issue.  Those motions did not cause dizziness.  Skin: Skin is warm and dry.   Psychiatric: The patient appears somewhat anxious    Emergency Department Course   ECG (01:08:25):  Rate 84 bpm. NM interval 168. QRS duration 86. QT/QTc 406/479. P-R-T axes 42 42 32. Normal sinus rhythm, william ECG. Compared to ECG on 12/17/18 and sinus tachycardia has resolved. Interpreted at 1326 by Mario Alberto Mckinney DO.    Imaging:  Radiographic findings were communicated with the patient who voiced understanding of the findings.  CT Head w/o contrast   CONCLUSION:  1.  No acute hemorrhage or CT evidence for acute infarct  Reading per radiology    CTA Neck with contrast   CONCLUSION:   HEAD CTA:   1.  The exam is limited due to suboptimal opacification of the intracranial circulation.  2.  Within these limitations, no branch vessel occlusion, high-grade stenosis, aneurysm, or high flow vascular malformation involving proximal [place] vessels.  NECK CTA:  1.  No significant stenosis in the neck vessels based on NASCET criteria.  2.  No evidence for dissection  Reading per radiology    CT Chest Pulmonary Embolism w contrast   IMPRESSION:  1. There is no large central pulmonary embolus on this limited exam.  No aortic aneurysm or dissection.  2. Mild cardiomegaly  Reading per radiology    Laboratory:  CBC: HGB 9.0(L) o/w WNL. (WBC 9.1, )   BMP: Glucose 113(H), potassium 3.1(L), calcium 8.3(L), o/w WNL (Creatinine: 0.58)    Troponin (Collected 0125): <0.015    D dimer: 0.8(H)    Interventions:  0153 Valium 2.5mg IV  0205 Compazine 10mg IV  0206 Benadryl 24mg IV  0404 valium 2.5mg IV  Meclizine 25mg PO: Pending  Decadron 10 mg IV: Pending    Emergency Department Course:  Past medical records, nursing notes, and vitals  reviewed.  0105: I performed an exam of the patient and obtained history, as documented above.    The patient was sent for a CT head, CTA head/neck, CT pulmonary while in the emergency department, findings above.    IV was inserted and blood was drawn for laboratory testing, results above.    0546: I rechecked the patient. Explained findings to patient. Reviewed Care Everywhere records and specifically the MRI from June (normal). Will plan for Obs.     0604: D/W Dr. Crook. Will plan for Obs. US B/L LE ordered. Meclizine ordered.      Impression & Plan      Medical Decision Making:  This 34-year-old female patient presents the ED due to dizziness and chest pain.  Please see the HPI and exam for specifics.  The patient's dizziness is somewhat better in the ED at least while she is supine.  When she stands up it returns and she still feels quite dizzy.  She also had a slight smile asymmetry noted on the left.  She is unsure if she is ever had this before.  I do not see any other motor or sensory deficit on her neurological exam but cerebellar testing does seem to provoke her dizziness.  We tried several times to get CT imaging and until we can get better control of her dizziness we are not able to get these images.  Given her pleuritic chest pain I checked a d-dimer which, unfortunately, was elevated.  Eventual CT imaging had suboptimal vascular imaging of the head and the neck as well as suboptimal imaging of the chest for PE.  No gross occlusive vascular disease was noted in the head or the neck and no obvious central pulmonary embolism was noted.  The patient seemed to get more tachycardic during her ED stay.  As she still felt quite dizzy on standing I discussed the case with the hospitalist will bring the patient into observation for continued medications and treatment.  I have ordered an ultrasound of the patient's bilateral lower extremities and that will be followed on by the inpatient team.    Diagnosis:     ICD-10-CM    1. Vertigo R42        Disposition:  Placed in observation to Dr. Guanaco Domínguez  7/14/2019   [facility] EMERGENCY DEPARTMENT  Scribe Disclosure:  I, Rosaura Domínguez, am serving as a scribe at 1:12 AM on 7/14/2019 to document services personally performed by Mario Alberto Mckinney DO based on my observations and the provider's statements to me.          Mario Alberto Mckinney DO  07/14/19 0626

## 2019-07-14 NOTE — H&P
[facility]    History and Physical  Hospitalist       Date of Admission:  7/14/2019  Date of Service (when I saw the patient): 07/14/19    Assessment & Plan   Memo Cordova is a 34 year old female patient with past medical history of migraines, uncomplicated asthma, came to emergency room for evaluation for dizziness.  Patient speaks [place]i and I talked to her through .  She stated that she started to have dizziness around 11 PM. She was feeling like the room spinning around her.  She had associated nausea and vomiting. She had no headaches.  In the ER she complained of some chest pain but her pain resolved.  Vital signs were stable on arrival to emergency room. CT of the head without contrast showed no evidence of acute infarct or hemorrhage. CT angiogram of the head/neck is also negative for significant stenosis or occlusion. CT of the chest with contrast showed no evidence of pulmonary embolism or dissection. Patient was given IV Benadryl, Zofran, Reglan, Valium in the emergency room.  She was also given meclizine p.o. she was admitted to observation unit for further evaluation and management.    1.  Vertigo secondary to peripheral vertigo versus migrainous symptoms.  There is no evidence of acute CVA on imaging studies.    We will put her on scheduled meclizine 25 mg every 8 hours.  We will also put her on IV Zofran, Compazine as needed for nausea/vomiting. Will resume her Maxalt and Topamax.  --Will consult physical therapy. Consider neurology consult if your symptoms persist.    2.  Tachycardia, likely due to anxiety.  No clear evidence of infection.  Her heart rate improved after the dose of Valium.  Was also given IV fluid in the ER.  Will resume PRN Ativan.    3.  History of uncomplicated asthma: No evidence of asthma exacerbation    Will admit patient to observation unit    DVT Prophylaxis: Pneumatic Compression Devices    Code Status: Full Code    Disposition: Expected  discharge in 1-2 days if her symptoms improve    Ashley Crook MD    Primary Care Physician   [facility]    Chief Complaint   Dizziness    History is obtained from the patient    History of Present Illness   Memo Cordova is a 34 year old female patient with past medical history of migraines, uncomplicated asthma, came to emergency room for evaluation for dizziness.  Patient speaks Frisian and I talked to her through .  She stated that she started to have dizziness around 11 PM.  She was feeling like the room spinning around her.  She had associated nausea and vomiting.  She denies headaches.  She also denies cough, shortness of breath.  She complained of some chest pain when she arrived in the ER but currently denies any chest pain.  She also denies abdominal pain, dysuria, urgency or frequency. She has no weakness of extremities.  She also denies visual abnormalities.  She has no fever or any recent travel.  In the ER, her temperature was 97.3, pulse 86, blood pressure 113/72, oxygen saturation 99% on room air.  Laboratory test was also done and showed sodium 138, potassium 3.1, creatinine 0.58, troponin less than 0.015, glucose 113, WBC 9.1, hemoglobin 9.0.  CT of the head without contrast was done and revealed no evidence of acute cranial abnormality.  CT angiogram of the head and neck is also negative for any significant stenosis or occlusion.  CT of the chest with contrast showed no evidence of pulmonary embolism, aortic aneurysm or dissection.  She was given IV Compazine, Benadryl, diazepam, dexamethasone.  She was also given  meclizine 25 mg p.o.  Patient had mild improvement of her symptoms.  Imaging studies were reviewed with neurology from emergency room and no concerns for acute stroke.  Per ER physician, stroke neurology is suspecting possible migraines. Patient was admitted to observation unit for further evaluation and management.    Past Medical History    I have  "reviewed this patient's medical history and updated it with pertinent information if needed.   Past Medical History:   Diagnosis Date     Uncomplicated asthma    Migraine headaches    Past Surgical History   I have reviewed this patient's surgical history and updated it with pertinent information if needed.  Past Surgical History:   Procedure Laterality Date     CHOLECYSTECTOMY         Prior to Admission Medications   Prior to Admission Medications   Prescriptions Last Dose Informant Patient Reported? Taking?   HYDROcodone-acetaminophen (NORCO) 5-325 MG tablet   No No   Sig: Take 1 tablet by mouth every 6 hours as needed for pain   LORazepam (ATIVAN) 1 MG tablet   No No   Sig: Take 0.5 tablets (0.5 mg) by mouth every 6 hours as needed for anxiety   rizatriptan (MAXALT) 5 MG tablet   No No   Sig: Take 1 tablet (5 mg) by mouth at onset of headache for migraine   topiramate (TOPAMAX) 25 MG capsule   Yes No   Sig: Take 25 mg by mouth 2 times daily      Facility-Administered Medications: None     Allergies   Allergies   Allergen Reactions     No Clinical Screening - See Comments      \"took a pill and developed itching\"       Social History   I have reviewed this patient's social history and updated it with pertinent information if needed. Memo Cordova  reports that she has never smoked. She has never used smokeless tobacco. She reports that she does not drink alcohol or use drugs.    Family History   I have reviewed this patient's family history and updated it with pertinent information if needed.   Denies family history of stroke    Review of Systems   The 10 point Review of Systems is negative other than noted in the HPI or here.  Patient complains of nausea and vomiting.  She has dizziness    Physical Exam   Temp: 97.3  F (36.3  C) Temp src: Temporal BP: (!) 146/101 Pulse: 106 Heart Rate: 113 Resp: 20 SpO2: 99 % O2 Device: None (Room air)    Vital Signs with Ranges  Temp:  [97.3  F (36.3  C)] 97.3  F (36.3 "  C)  Pulse:  [] 106  Heart Rate:  [108-113] 113  Resp:  [18-37] 20  BP: (113-152)/() 146/101  SpO2:  [97 %-100 %] 99 %  0 lbs 0 oz    GEN:  Alert, oriented x 3, appears comfortable, NAD.  HEENT:  Normocephalic/atraumatic, no scleral icterus, no nasal discharge, mouth moist.  CV:  Regular rate and rhythm, no murmur or JVD.  S1 + S2 noted, no S3 or S4.  LUNGS:  Clear to auscultation bilaterally without rales/rhonchi/wheezing/retractions.  Symmetric chest rise on inhalation noted.  ABD:  Active bowel sounds, soft, non-tender/non-distended.  No rebound/guarding/rigidity.  EXT:  No edema or cyanosis.  Hands/feet warm to touch with good signs of peripheral perfusion.  No joint synovitis noted.  SKIN:  Dry to touch, no exanthems noted in the visualized areas.  NEURO:  Symmetric muscle strength, sensation to touch grossly intact.  No new focal deficits appreciated.    Data   Data reviewed today:  I personally reviewed EKG shows normal sinus rhythm at 84 bpm.  There is no significant ST-T changes  Recent Labs   Lab 07/14/19  0055   WBC 9.1   HGB 9.0*   MCV 75*         POTASSIUM 3.1*   CHLORIDE 107   CO2 25   BUN 15   CR 0.58   ANIONGAP 6   ROSEMARY 8.3*   *   TROPI <0.015       Recent Results (from the past 24 hour(s))   CT Head w/o Contrast    Narrative    EXAM: CT HEAD W/O CONTRAST  LOCATION: [facility]  DATE/TIME: 7/14/2019 1:24 AM    INDICATION: Dizziness. Nausea. Asymmetric smile  COMPARISON: None  TECHNIQUE: Routine without IV contrast. Multiplanar reformats. Dose reduction techniques were used.    FINDINGS:  INTRACRANIAL CONTENTS: No intracranial hemorrhage, extraaxial collection, or mass effect.  No CT evidence of acute infarct. Normal parenchymal density for age. The ventricles and sulci are normal for age.     VISUALIZED ORBITS/SINUSES/MASTOIDS: No significant orbital abnormality. No significant paranasal sinus mucosal disease. No significant middle ear or mastoid  effusion.    BONES/SOFT TISSUES: No significant abnormality.      Impression    CONCLUSION:  1.  No acute hemorrhage or CT evidence for acute infarct.    Exam discussed with Dr. Mckinney at 1:50 AM.   CT Chest Pulmonary Embolism w Contrast    Narrative    CT CHEST PULMONARY EMBOLISM WITH CONTRAST  7/14/2019 4:35 AM    HISTORY:  Pleuritic chest pain, elevated D-dimer.    TECHNIQUE: Scans obtained from the apices through the diaphragm with  IV contrast,  70 mL Isovue-370 injected. Radiation dose for this scan  was reduced using automated exposure control, adjustment of the mA  and/or kV according to patient size, or iterative reconstruction  technique.    COMPARISON:  None.    FINDINGS:  Evaluation for pulmonary embolus is limited by contrast  bolus and patient body habitus. The contrast bolus was timed for the  accompanying CT angiogram. Additionally, patient's arms are down for  angiogram, further limiting the chest CT. There is no large central  pulmonary embolus. Small peripheral emboli cannot be ruled out. There  is no aortic aneurysm or dissection. The heart is mildly enlarged.  There is no mediastinal, hilar or axillary lymph node enlargement.  There is dependent atelectasis bilaterally. No pneumothorax or pleural  effusion. Images through the upper abdomen show no acute  abnormalities. The gallbladder is absent.      Impression    IMPRESSION:  1. There is no large central pulmonary embolus on this limited exam.  No aortic aneurysm or dissection.  2. Mild cardiomegaly.    LOLLY DARBY MD   CTA Head Neck with Contrast    Narrative    EXAM: CTA  HEAD NECK WITH CONTRAST  LOCATION: [facility]  DATE/TIME: 7/14/2019 1:21 AM    INDICATION: Dizziness. Nausea. Asymmetric smile.  COMPARISON: Noncontrast head CT on the same date  CONTRAST: 70mL Isovue-370  TECHNIQUE: Head and neck CT angiogram with IV contrast. Noncontrast head CT followed by axial helical CT images of the head and neck vessels obtained  during the arterial phase of intravenous contrast administration. Axial 2D reconstructed images and   multiplanar 3D MIP reconstructed images of the head and neck vessels were performed by the technologist. Dose reduction techniques were used.     FINDINGS:   HEAD CTA: The exam is limited due to patient body habitus and suboptimal opacification of the intracranial circulation.  ANTERIOR CIRCULATION: No significant stenosis or occlusion. Standard [place] of Koch anatomy.    POSTERIOR CIRCULATION: No significant stenosis or occlusion. Balanced vertebral arteries supply a normal basilar artery.     ANEURYSM/VASCULAR MALFORMATION: None.    DURAL VENOUS SINUSES: Expected enhancement of the major dural venous sinuses.    NECK CTA:  RIGHT CAROTID: No measurable stenosis in the right ICA based on NASCET criteria.    LEFT CAROTID: No measurable stenosis in the left ICA based on NASCET criteria.    VERTEBRAL ARTERIES: Balanced vertebral arteries are patent in the neck and into the head.     AORTIC ARCH: Classic aortic arch anatomy with no significant stenosis at the origin of the great vessels.    MISCELLANEOUS: No evidence for dissection or pseudoaneurysm. The visualized upper chest is unremarkable. Age appropriate appearance of the cervical spine.      Impression    CONCLUSION:   HEAD CTA:   1.  The exam is limited due to suboptimal opacification of the intracranial circulation.  2.  Within these limitations, no branch vessel occlusion, high-grade stenosis, aneurysm, or high flow vascular malformation involving proximal [place] Koch vessels.    NECK CTA:  1.  No significant stenosis in the neck vessels based on NASCET criteria.  2.  No evidence for dissection.

## 2019-07-14 NOTE — PROGRESS NOTES
Patient's After Visit Summary was reviewed with patient and/or spouse with .   Patient verbalized understanding of After Visit Summary, recommended follow up and was given an opportunity to ask questions.   Discharge medications sent home with patient/family: YES   Discharged with spouse    Pt. Walked hallways with  present, reports feeling safe discharging

## 2019-07-14 NOTE — ED NOTES
Patient instructed to hold still for CT of head. Patient uncooperative reporting to feel dizzy. Patient redirected patient unable to lay down. Vomited once.   Given medications per POC. Patient refusing to have scan done. Requesting  to be in CT room. Educated patient that CT of head is important to r/o CVA. Patient refusing CT. MD burns made aware. Sent back to room

## 2019-07-14 NOTE — PLAN OF CARE
PRIMARY DIAGNOSIS: VERTIGO    OUTPATIENT/OBSERVATION GOALS TO BE MET BEFORE DISCHARGE  1. Orthostatic performed: Yes:          Lying Orthostatic BP: 119/79         Sitting Orthostatic BP: 140/98         Standing Orthostatic BP: 143/101     2. Completion of appropriate imaging: Yes    3. Tolerating PO medications: Yes    4. Return to near baseline physical activity: Yes    5. Cleared for discharge by consultants (if involved): Yes    Pt. Walked around hallways, reports dizziness minimal.  Pt. Reports she feels safe discharging home.  Discharging , ESTEFANIA Anne, aware.      Discharge Planner Nurse   Safe discharge environment identified: Yes  Barriers to discharge: No       Entered by: Tre Azul 07/14/2019 4:29 PM     Please review provider order for any additional goals.   Nurse to notify provider when observation goals have been met and patient is ready for discharge.

## 2019-07-14 NOTE — PHARMACY-ADMISSION MEDICATION HISTORY
Admission medication history interview status for this patient is complete. See [facility] admission navigator for allergy information, prior to admission medications and immunization status.     Medication history interview source(s):Patient  Medication history resources (including written lists, pill bottles, clinic record): SureScripts and Care Everywhere  Primary [facility]: [facility]    Changes made to PTA medication list:  Added: Nortriptyline and Advair  Deleted:  Norco, lorazepam, rizatriptan, and topiramate  Changed:  none    Actions taken by pharmacist (provider contacted, etc):None     Additional medication history information:  was used to communicate with the patient.    Medication reconciliation/reorder completed by provider prior to medication history? Yes    Do you take OTC medications (eg tylenol, ibuprofen, fish oil, eye/ear drops, etc)? No    Prior to Admission medications    Medication Sig Last Dose Taking? Auth Provider   fluticasone-salmeterol (ADVAIR) 500-50 MCG/DOSE inhaler Inhale 1 puff into the lungs every 12 hours 7/13/2019 at pm Yes Unknown, Entered By History   nortriptyline (PAMELOR) 75 MG capsule Take 75 mg by mouth At Bedtime 7/12/2019 at bedtime Yes Unknown, Entered By History

## 2019-07-14 NOTE — PLAN OF CARE
PRIMARY DIAGNOSIS: dizziness, N/V, chest pain  OUTPATIENT/OBSERVATION GOALS TO BE MET BEFORE DISCHARGE:  1. Orthostatic performed: No    2. Diagnostic testing complete & at baseline neurologic testing: No    3. Cleared by consultants (if involved): No    4. Interpretation of cardiac rhythm per telemetry tech: SR, Sinus tachy    5. Tolerating adequate PO diet and medications: Yes    6. Return to near baseline physical activity or neurologic status: No    Discharge Planner Nurse   Safe discharge environment identified: Yes  Barriers to discharge: Yes       Entered by: Cas Chowdhury 07/14/2019     Pt alert and oriented x4. She is Citizen of [place] speaking.  not here yet this AM. She is an assist x1. Attempted to get up and ambulate this AM, but was very dizzy so she went back to bed. Having constant dizziness with some intermittent chest pain across both sides of her chest. Tele has been SR and sinus tachy. Assist x1. NS w/ K running @ 100ml/hr. Given meclizine- states it helped dizziness a bit. Regular diet, tolerating well with no nausea. PT will see pt today.    /76 (BP Location: Left arm)   Pulse 110   Temp 98.3  F (36.8  C) (Oral)   Resp 16   Wt 119.6 kg (263 lb 11.2 oz)   SpO2 95%        Please review provider order for any additional goals.   Nurse to notify provider when observation goals have been met and patient is ready for discharge.

## 2019-07-14 NOTE — PLAN OF CARE
PRIMARY DIAGNOSIS: dizziness  OUTPATIENT/OBSERVATION GOALS TO BE MET BEFORE DISCHARGE:  1. Orthostatic performed: Yes:          Lying Orthostatic BP: 119/79         Sitting Orthostatic BP: 140/98         Standing Orthostatic BP: 143/101     2. Diagnostic testing complete & at baseline neurologic testing: Yes    3. Cleared by consultants (if involved): Yes    4. Interpretation of cardiac rhythm per telemetry tech: SR, Sinus tach    5. Tolerating adequate PO diet and medications: Yes    6. Return to near baseline physical activity or neurologic status: Yes    Discharge Planner Nurse   Safe discharge environment identified: Yes  Barriers to discharge: No       Entered by: Cas Chowdhury 07/14/2019     Pt alert and oriented x4. She is Maced[place] speaking.  available. She is an assist x1. Tele has been SR and sinus tachy. Assist x1. NS w/ K running. Given meclizine- states it helped dizziness a bit. Regular diet, tolerating well with no nausea. Saw PT today and after their treatment she states the dizziness is much better. Ortho's negative.     /76 (BP Location: Left arm)   Pulse 110   Temp 98.3  F (36.8  C) (Oral)   Resp 16   Wt 119.6 kg (263 lb 11.2 oz)   SpO2 95%        Please review provider order for any additional goals.   Nurse to notify provider when observation goals have been met and patient is ready for discharge.

## 2019-07-14 NOTE — LETTER
July 14, 2019      Memo Cordova  [address] [facility] [address]        To Whom It May Concern:    Memo Cordova was seen on 7/13-7/14/19.  Please excuse her until 7/15/19 due to illness.        Sincerely,      Kathleen Wilson PA-C

## 2019-07-14 NOTE — DISCHARGE SUMMARY
[facility]  [facility]charge Summary    Memo Cordova MRN# [id number]   YOB: 1985 Age: 34 year old     Date of Admission:  7/14/2019  Date of Discharge:  7/14/2019  Admitting Physician:  Ashley Crook MD  Discharge Physician:  Barbara Montenegro PA-C;  Janessa Griffin MD  Discharging Service:  Hospitalist     Primary Provider: [facility], [phone]          Admission Diagnosis:   Dizziness          Discharge Diagnoses:   1. Dizziness and tachycardia felt related to dehydration  2. History of Migraine headaches  3. Anemia likely related to recent heavy menstrual bleeding s/p D&C             Hospital Course:   Memo Cordova is a 34 year old female with history of migraine headaches who presented to [facility] on 7/14/2019 with complaints of dizziness.  Work-up in the ER also revealed a nonspecific sinus tachycardia to 110.  Labs notable for hemoglobin of 9 (was 12.3 in April 2019) and DDimer of 0.8 (high).  CT Chest PE Rule Out negative for acute process or clot.  CT Head negative.  CTA Head and Neck within normal limits.  Patient received IV compazine, benadryl, valium, dexamethasone, and meclizine with continued symptoms.  Admitted to the Observation Unit for further monitoring.  Seen by PT and OT.  May have a component of BPPV however dizziness occurred mainly duy going from laying to sitting raising suspicion for orthostatic hypotension.  Hydrated with IV fluids which helped symptoms.  Also received schedule meclizine.  Patient tolerating PO.  Dizziness much improved and patient feels comfortable discharging home.  With regard to her anemia, she had very heavy menstrual bleeding for several months and is now over a month out from what sounds like a D&C.  She has not had any further vaginal bleeding; no other unusual bruising or bleeding.  She is agreeable to following up with her PMD in 3 weeks for a recheck hemoglobin.  She will also  follow-up with her primary (or neurology) regarding migraine headaches.            Condition on Discharge:   Discharge condition: Fair   Discharge to: Discharged to home   Code status on discharge: Full Code          Discharge Orders:        PHYSICAL THERAPY REFERRAL      Reason for your hospital stay    Admitted with dizziness and tachycardia.  Symptoms improved with fluids, meclizine, and rest.     Activity    Your activity upon discharge: activity as tolerated     When to contact your care team    Call your primary doctor if you have any of the following: temperature greater than 101, shortness of breath, new weakness, or other new or concerning symptoms.     Follow-up and recommended labs and tests     Follow up with primary care provider, [facility], in 3 weeks for hospital follow- up and recheck of anemia.  Follow-up CBC or hemoglobin alone recommended.     Full Code     Diet    Resume your normal diet as able.            Discharge Medications:      Review of your medicines      START taking      Dose / Directions   meclizine 25 MG tablet  Commonly known as:  ANTIVERT      Dose:  25 mg  Take 1 tablet (25 mg) by mouth 3 times daily as needed for dizziness  Quantity:  20 tablet  Refills:  0        CONTINUE these medicines which have NOT CHANGED      Dose / Directions   fluticasone-salmeterol 500-50 MCG/DOSE inhaler  Commonly known as:  ADVAIR      Dose:  1 puff  Inhale 1 puff into the lungs every 12 hours  Refills:  0     nortriptyline 75 MG capsule  Commonly known as:  PAMELOR      Dose:  75 mg  Take 75 mg by mouth At Bedtime  Refills:  0           Where to get your medicines      These medications were sent to [facility] - [place], [place] - [address], [place] [address]    Phone:  [phone]     meclizine 25 MG tablet               Physical Exam:   Temp:  [97.3  F (36.3  C)-98.3  F (36.8  C)] 98.3  F (36.8  C)  Pulse:  []  110  Heart Rate:  [103-113] 103  Resp:  [16-20] 16  BP: (113-152)/() 124/76  SpO2:  [95 %-100 %] 95 %    General Appearance:  Pleasant, cooperative, alert. Well developed, well nourished.    HEENT: Normocephalic, atraumatic.     Lungs: Clear to auscultation bilaterally    Heart: Fast but regular.  No appreciated murmur.     Abdomen: +bowel tones, Soft, nontender non distended.   Musculoskeletal:  Moving x 4 spontaneously with CMS intact x4.    Neuro: Alert and oriented x3.  CN II-XII grossly intact and symmetric. Nonfocal exam.          Imaging:   Recent Results (from the past 24 hour(s))   CT Head w/o Contrast    Narrative    EXAM: CT HEAD W/O CONTRAST  LOCATION: [facility]  DATE/TIME: 7/14/2019 1:24 AM    INDICATION: Dizziness. Nausea. Asymmetric smile  COMPARISON: None  TECHNIQUE: Routine without IV contrast. Multiplanar reformats. Dose reduction techniques were used.    FINDINGS:  INTRACRANIAL CONTENTS: No intracranial hemorrhage, extraaxial collection, or mass effect.  No CT evidence of acute infarct. Normal parenchymal density for age. The ventricles and sulci are normal for age.     VISUALIZED ORBITS/SINUSES/MASTOIDS: No significant orbital abnormality. No significant paranasal sinus mucosal disease. No significant middle ear or mastoid effusion.    BONES/SOFT TISSUES: No significant abnormality.      Impression    CONCLUSION:  1.  No acute hemorrhage or CT evidence for acute infarct.    Exam discussed with Dr. Mckinney at 1:50 AM.   CT Chest Pulmonary Embolism w Contrast    Narrative    CT CHEST PULMONARY EMBOLISM WITH CONTRAST  7/14/2019 4:35 AM    HISTORY:  Pleuritic chest pain, elevated D-dimer.    TECHNIQUE: Scans obtained from the apices through the diaphragm with  IV contrast,  70 mL Isovue-370 injected. Radiation dose for this scan  was reduced using automated exposure control, adjustment of the mA  and/or kV according to patient size, or iterative  reconstruction  technique.    COMPARISON:  None.    FINDINGS:  Evaluation for pulmonary embolus is limited by contrast  bolus and patient body habitus. The contrast bolus was timed for the  accompanying CT angiogram. Additionally, patient's arms are down for  angiogram, further limiting the chest CT. There is no large central  pulmonary embolus. Small peripheral emboli cannot be ruled out. There  is no aortic aneurysm or dissection. The heart is mildly enlarged.  There is no mediastinal, hilar or axillary lymph node enlargement.  There is dependent atelectasis bilaterally. No pneumothorax or pleural  effusion. Images through the upper abdomen show no acute  abnormalities. The gallbladder is absent.      Impression    IMPRESSION:  1. There is no large central pulmonary embolus on this limited exam.  No aortic aneurysm or dissection.  2. Mild cardiomegaly.    LOLLY DARBY MD   CTA Head Neck with Contrast    Narrative    EXAM: CTA  HEAD NECK WITH CONTRAST  LOCATION: [facility]  DATE/TIME: 7/14/2019 1:21 AM    INDICATION: Dizziness. Nausea. Asymmetric smile.  COMPARISON: Noncontrast head CT on the same date  CONTRAST: 70mL Isovue-370  TECHNIQUE: Head and neck CT angiogram with IV contrast. Noncontrast head CT followed by axial helical CT images of the head and neck vessels obtained during the arterial phase of intravenous contrast administration. Axial 2D reconstructed images and   multiplanar 3D MIP reconstructed images of the head and neck vessels were performed by the technologist. Dose reduction techniques were used.     FINDINGS:   HEAD CTA: The exam is limited due to patient body habitus and suboptimal opacification of the intracranial circulation.  ANTERIOR CIRCULATION: No significant stenosis or occlusion. Standard [place] of Koch anatomy.    POSTERIOR CIRCULATION: No significant stenosis or occlusion. Balanced vertebral arteries supply a normal basilar artery.     ANEURYSM/VASCULAR  MALFORMATION: None.    DURAL VENOUS SINUSES: Expected enhancement of the major dural venous sinuses.    NECK CTA:  RIGHT CAROTID: No measurable stenosis in the right ICA based on NASCET criteria.    LEFT CAROTID: No measurable stenosis in the left ICA based on NASCET criteria.    VERTEBRAL ARTERIES: Balanced vertebral arteries are patent in the neck and into the head.     AORTIC ARCH: Classic aortic arch anatomy with no significant stenosis at the origin of the great vessels.    MISCELLANEOUS: No evidence for dissection or pseudoaneurysm. The visualized upper chest is unremarkable. Age appropriate appearance of the cervical spine.      Impression    CONCLUSION:   HEAD CTA:   1.  The exam is limited due to suboptimal opacification of the intracranial circulation.  2.  Within these limitations, no branch vessel occlusion, high-grade stenosis, aneurysm, or high flow vascular malformation involving proximal [place] of Koch vessels.    NECK CTA:  1.  No significant stenosis in the neck vessels based on NASCET criteria.  2.  No evidence for dissection.   US Lower Extremity Venous Duplex Bilateral    Narrative    ULTRASOUND VENOUS LOWER EXTREMITY BILATERAL 7/14/2019 7:19 AM     HISTORY: Elevated D-dimer.    COMPARISON: None.    TECHNIQUE: Ultrasound gray scale, Color Doppler flow, and spectral  Doppler waveform analysis performed.    FINDINGS: The bilateral common femoral, superficial femoral, popliteal  and posterior tibial veins are patent and fully compressible and  demonstrate normal venous Doppler flow. The visualized greater  saphenous veins are negative for thrombus.      Impression    IMPRESSION: No DVT demonstrated.    YAW LUQUE MD                Labs:   Recent Labs   Lab Test 07/14/19  0055 04/03/19  1016 12/17/18  1613    139 140   POTASSIUM 3.1* 3.9 3.6   CHLORIDE 107 106 104   CO2 25 30 26   BUN 15 10 9   CR 0.58 0.63 0.57     Recent Labs   Lab Test 07/14/19  0055 04/03/19  1016 12/17/18  1613    WBC 9.1 6.4 13.0*   HGB 9.0* 12.3 12.7    337 402     Recent Labs   Lab Test 07/14/19  0055 12/17/18  1613   TROPI <0.015 <0.015               Procedures:   None.          Pending Results:   None        This case was discussed with Dr Griffin of the hospitalist service who agrees with discharge at this time.      Time Spent on this Encounter   I, LIDYA Solorzano, personally saw the patient today and spent greater than 30 minutes discharging this patient.    As dictated by SEVEN JOYNER MS, PA-C

## 2019-07-14 NOTE — PROGRESS NOTES
07/14/19 1323   Quick Adds   Quick Adds Vestibular Eval   Type of Visit Initial PT Evaluation       Present yes   Language [place]an   Living Environment   Lives With spouse   Living Arrangements house   Functional Level Prior   Ambulation 0-->independent   Transferring 0-->independent   Toileting 0-->independent   Bathing 0-->independent   General Information   Onset of Illness/Injury or Date of Surgery - Date 07/13/19   Referring Physician Ashley Crook MD   Patient/Family Goals Statement Pt hoping to improve dizziness   Pertinent History of Current Problem (include personal factors and/or comorbidities that impact the POC) Memo Cordova is a 34 year old female patient with past medical history of migraines, uncomplicated asthma, came to emergency room for evaluation for dizziness.  Patient speaks Slovak and I talked to her through .  She stated that she started to have dizziness around 11 PM. She was feeling like the room spinning around her.  She had associated nausea and vomiting. She had no headaches.  In the ER she complained of some chest pain but her pain resolved.  Vital signs were stable on arrival to emergency room. CT of the head without contrast showed no evidence of acute infarct or hemorrhage. CT angiogram of the head/neck is also negative for significant stenosis or occlusion. CT of the chest with contrast showed no evidence of pulmonary embolism or dissection. Patient was given IV Benadryl, Zofran, Reglan, Valium in the emergency room.  She was also given meclizine p.o. she was admitted to observation unit for further evaluation and management.   Cognitive Status Examination   Orientation orientation to person, place and time   Level of Consciousness alert   Follows Commands and Answers Questions 100% of the time   Bed Mobility   Bed Mobility Comments CGA   Transfer Skills   Transfer Comments CGA   Gait   Gait Comments Fatigue and dizziness  following testing, deferred currently   Balance   Balance Comments Poor balance unable to tolerate more than small walk to BR due to dizziness.    Cervicogenic Screen   Neck ROM WFL   Oculomotor Exam   Smooth Pursuit Normal   Saccades Normal   VOR Normal   VOR Cancellation Normal   Rapid Head Thrust Corrective Saccade L head thrust   Convergence Testing Normal   Infrared Goggle Exam or Frenzel Lense Exam   Exam completed with Room Light   Gaze Evoked Nystagmus Negative   [place]-Hallpike (Right) Comments Mild dizziness in this position, no report of room spinning, no nystagmus   [place]-Hallpike (Left) Comments Severe dizziness in this position, attempting to sit out of position, report of room spinning, unable to clearly see nystagmus as pt closing eyes and not tolerating lights on in this position   General Therapy Interventions   Planned Therapy Interventions bed mobility training;balance training;gait training;strengthening;transfer training;risk factor education;home program guidelines;progressive activity/exercise;neuromuscular re-education   Clinical Impression   Criteria for Skilled Therapeutic Intervention yes, treatment indicated   PT Diagnosis decreased functional mobility due to dizziness   Influenced by the following impairments decreased balance   Functional limitations due to impairments decreased transfers, ambulation, stairs   Clinical Presentation Evolving/Changing   Clinical Presentation Rationale continued work up for dizziness   Clinical Decision Making (Complexity) Low complexity   Therapy Frequency Daily   Predicted Duration of Therapy Intervention (days/wks) 2 days   Anticipated Equipment Needs at Discharge front wheeled walker   Anticipated Discharge Disposition Home with Assist;Home with Outpatient Therapy   Risk & Benefits of therapy have been explained Yes   Patient, Family & other staff in agreement with plan of care Yes   Clinical Impression Comments Pt has signs and symptoms that are  "supportive of nueritis as well as BPPV. Pt has positive HIT, and positive L di seals chinmay. Unable to see any nystagmus upone examination   [facility] AM-PAC TM \"6 Clicks\"   2016, Trustees of [facility], under license to [employer].  All rights reserved.   6 Clicks Short Forms Basic Mobility Inpatient Short Form   [facility] AM-PAC  \"6 Clicks\" V.2 Basic Mobility Inpatient Short Form   1. Turning from your back to your side while in a flat bed without using bedrails? 3 - A Little   2. Moving from lying on your back to sitting on the side of a flat bed without using bedrails? 3 - A Little   3. Moving to and from a bed to a chair (including a wheelchair)? 3 - A Little   4. Standing up from a chair using your arms (e.g., wheelchair, or bedside chair)? 3 - A Little   5. To walk in hospital room? 3 - A Little   6. Climbing 3-5 steps with a railing? 3 - A Little   Basic Mobility Raw Score (Score out of 24.Lower scores equate to lower levels of function) 18   Total Evaluation Time   Total Evaluation Time (Minutes) 10     "

## 2019-07-14 NOTE — PLAN OF CARE
PT: PT caroleal completed. Pt here with acute onset of dizziness. Pt reports was lying in bed when this came on. Pt reports room spinning sensation. Pt also endorses hearing/tinnitus changes as well. Pt reports has been dizzy since last night, room spinning has stopped, but can come on intermittently, cannot endorse any position that it is stronger. Pt also reports that she has been having headaches and chest pain as well.   Discharge Planner PT   Patient plan for discharge: none stated  Current status: Pt being seen for vestibular evaluation. Pt normal visual screen, able to perform end range without nystagmus, VOR intact. Pt has positive head impulse test with correct saccade with L head impulse 2 beats. Pt reporting mild dizziness throughout exam and in positioning. Positive for severe dizziness and room spinning with L chinmay hallpike. Pt was treated for L canalthisis with canalith repositioning maneuver. Cued through positioning x 2. Pt improved chinmay hallpike following. Pt continues to report dizziness and fatigue. Returned to supine due to this, educated to attempt another walk with RN in an hour. Of note per RNs able to ambulate only to BR earlier due to dizziness. Also signs of labyrinthitis or neuritis of vestibular nerve at this time with positive head impulse test and tolerance to mobility.   Barriers to return to prior living situation: continued dizziness  Recommendations for discharge: not appropriate to DISCHARGE today due to continued dizziness, recommend further work up and re-assessment by therapy tomorrow unless dizziness improves and able to tolerate ambulation. Recommend OP vestibular therapy  Rationale for recommendations: Pt currently continues to have severe dizziness impairing her tolerance for mobility and activity at this time. Pt would benefit from continued work with PT tomorrow for at least a further assessment into her tolerance to basic mobility items with retesting as appropriate pending  symptomology.        Entered by: Lynne Lira 07/14/2019 1:56 PM

## 2019-07-14 NOTE — ED TRIAGE NOTES
Pt states chest pain and dizziness that began approximately 1 hour pta. States pain worse with inspiration. ABCs intact GCS 15

## 2019-07-14 NOTE — ED NOTES
"[facility]  ED Nurse Handoff Report    Memo Cordova is a 34 year old female   ED Chief complaint: Chest Pain  . ED Diagnosis:   Final diagnoses:   Vertigo     Allergies:   Allergies   Allergen Reactions     No Clinical Screening - See Comments      \"took a pill and developed itching\"       Code Status: Full Code  Activity level - Baseline/Home:  Independent. Activity Level - Current:   Stand by Assist. Lift room needed: No. Bariatric: No   Needed: Vietnamese  Isolation: No. Infection: Not Applicable.     Vital Signs:   Vitals:    07/14/19 0430 07/14/19 0500 07/14/19 0530 07/14/19 0600   BP: (!) 139/91 (!) 137/92 (!) 152/103 (!) 146/101   Pulse: 109 107 110 106   Resp: (!) 37 (!) 32 20 20   Temp:       TempSrc:       SpO2: 100% 97% 99% 99%       Cardiac Rhythm:  ,      Pain level: 0-10 Pain Scale: 8  Patient confused: No. Patient Falls Risk: No.   Elimination Status: Has voided   Patient Report - Initial Complaint: chest pain & dizziness. Focused Assessment: Memo Cordova is a 34 year old female with a history of asthma and migraines who presents with chest pain and dizziness that both began at 11pm last night. Currently she feels worse than then at that time and also reports shortness of breath, nausea, burning upper abdominal pain, and numbness in both her feet. She has never had an episode of this dizziness before and feels like the room is spinning and she feels like she is going to faint. Nurse reports that she endorsed a productive cough. She denies any recent travel or nay sick contacts at home. The patient had a recent brain MRI on 6/9, finding listed below.     MRI brain w/o contrast  Impression:   There are a few nonspecific foci of T2-hyperintensity in the bifrontal white matter, perhaps minimally more than expected for age; these may represent the sequelae of migraine headaches. Otherwise unremarkable appearance of the intracranial contents.    Nonspecific " inflammatory changes in the maxillary sinuses and mild fluid in the mastoid air cells. Prominent adenoid tonsils may be reactive.   Tests Performed:   Labs Ordered and Resulted from Time of ED Arrival Up to the Time of Departure from the ED   CBC WITH PLATELETS DIFFERENTIAL - Abnormal; Notable for the following components:       Result Value    Hemoglobin 9.0 (*)     Hematocrit 30.9 (*)     MCV 75 (*)     MCH 21.8 (*)     MCHC 29.1 (*)     RDW 18.0 (*)     All other components within normal limits   BASIC METABOLIC PANEL - Abnormal; Notable for the following components:    Potassium 3.1 (*)     Glucose 113 (*)     Calcium 8.3 (*)     All other components within normal limits   D DIMER QUANTITATIVE - Abnormal; Notable for the following components:    D Dimer 0.8 (*)     All other components within normal limits   TROPONIN I   PERIPHERAL IV CATHETER     CTA Head Neck with Contrast   Final Result   CONCLUSION:    HEAD CTA:    1.  The exam is limited due to suboptimal opacification of the intracranial circulation.   2.  Within these limitations, no branch vessel occlusion, high-grade stenosis, aneurysm, or high flow vascular malformation involving proximal [place] of Koch vessels.      NECK CTA:   1.  No significant stenosis in the neck vessels based on NASCET criteria.   2.  No evidence for dissection.      CT Chest Pulmonary Embolism w Contrast   Final Result   IMPRESSION:   1. There is no large central pulmonary embolus on this limited exam.   No aortic aneurysm or dissection.   2. Mild cardiomegaly.      LOLLY DARBY MD      CT Head w/o Contrast   Final Result   CONCLUSION:   1.  No acute hemorrhage or CT evidence for acute infarct.      Exam discussed with Dr. Mckinney at 1:50 AM.      US Lower Extremity Venous Duplex Bilateral    (Results Pending)      Abnormal Results: see above.   Treatments provided: see MAR  Family Comments:  present  OBS brochure/video discussed/provided to patient:  Yes  ED  Medications:   Medications   dexamethasone PF (DECADRON) injection 10 mg (has no administration in time range)   0.9% sodium chloride BOLUS (has no administration in time range)   meclizine (ANTIVERT) tablet 25 mg (has no administration in time range)   0.9% sodium chloride BOLUS (has no administration in time range)   prochlorperazine (COMPAZINE) injection 10 mg (10 mg Intravenous Given 7/14/19 0205)   diphenhydrAMINE (BENADRYL) injection 25 mg (25 mg Intravenous Given 7/14/19 0206)   0.9% sodium chloride BOLUS (0 mLs Intravenous Stopped 7/14/19 0458)   iopamidol (ISOVUE-370) solution 500 mL (140 mLs Intravenous Given 7/14/19 0149)   diazepam (VALIUM) injection 2.5 mg (2.5 mg Intravenous Given 7/14/19 0153)   diazepam (VALIUM) injection 2.5 mg (2.5 mg Intravenous Given 7/14/19 0404)     Drips infusing:  Yes-IVF  For the majority of the shift, the patient's behavior Green. Interventions performed were none.     Severe Sepsis OR Septic Shock Diagnosis Present: No      ED Nurse Name/Phone Number: Nii Siu,   6:15 AM    RECEIVING UNIT ED HANDOFF REVIEW    Above ED Nurse Handoff Report was reviewed: Yes  Reviewed by: Rosaura Wiley on July 14, 2019 at 6:45 AM

## 2019-07-15 LAB — INTERPRETATION ECG - MUSE: NORMAL

## 2019-07-15 NOTE — PLAN OF CARE
Physical Therapy Discharge Summary    Reason for therapy discharge:    Discharged to home.    Progress towards therapy goal(s). See goals on Care Plan in [facility] electronic health record for goal details.  Goals not met.  Barriers to achieving goals:   discharge from facility.    Therapy recommendation(s):    Continued therapy is recommended.  Rationale/Recommendations:  OP vestibular therapy for residual symptoms of dizziness/vertigo. .     Note: Pt not seen by documenting PT on this date. Information obtained from chart review.

## 2020-03-15 ENCOUNTER — HOSPITAL ENCOUNTER (EMERGENCY)
Facility: CLINIC | Age: 35
Discharge: HOME | End: 2020-03-15
Attending: EMERGENCY MEDICINE | Admitting: EMERGENCY MEDICINE
Payer: MEDICAID

## 2020-03-15 ENCOUNTER — APPOINTMENT (OUTPATIENT)
Dept: ULTRASOUND IMAGING | Facility: CLINIC | Age: 35
End: 2020-03-15
Attending: EMERGENCY MEDICINE
Payer: MEDICAID

## 2020-03-15 VITALS
SYSTOLIC BLOOD PRESSURE: 129 MMHG | TEMPERATURE: 97.6 F | HEART RATE: 86 BPM | OXYGEN SATURATION: 99 % | DIASTOLIC BLOOD PRESSURE: 66 MMHG | RESPIRATION RATE: 16 BRPM

## 2020-03-15 DIAGNOSIS — O03.4 INCOMPLETE MISCARRIAGE: ICD-10-CM

## 2020-03-15 LAB
B-HCG SERPL-ACNC: 3845 IU/L (ref 0–5)
BASOPHILS # BLD AUTO: 0 10E9/L (ref 0–0.2)
BASOPHILS NFR BLD AUTO: 0.3 %
DIFFERENTIAL METHOD BLD: ABNORMAL
EOSINOPHIL # BLD AUTO: 0.1 10E9/L (ref 0–0.7)
EOSINOPHIL NFR BLD AUTO: 2.2 %
ERYTHROCYTE [DISTWIDTH] IN BLOOD BY AUTOMATED COUNT: 23.6 % (ref 10–15)
HCT VFR BLD AUTO: 40.3 % (ref 35–47)
HGB BLD-MCNC: 11.9 G/DL (ref 11.7–15.7)
IMM GRANULOCYTES # BLD: 0 10E9/L (ref 0–0.4)
IMM GRANULOCYTES NFR BLD: 0.2 %
LYMPHOCYTES # BLD AUTO: 2.1 10E9/L (ref 0.8–5.3)
LYMPHOCYTES NFR BLD AUTO: 36.5 %
MCH RBC QN AUTO: 24.5 PG (ref 26.5–33)
MCHC RBC AUTO-ENTMCNC: 29.5 G/DL (ref 31.5–36.5)
MCV RBC AUTO: 83 FL (ref 78–100)
MONOCYTES # BLD AUTO: 0.4 10E9/L (ref 0–1.3)
MONOCYTES NFR BLD AUTO: 7 %
NEUTROPHILS # BLD AUTO: 3.1 10E9/L (ref 1.6–8.3)
NEUTROPHILS NFR BLD AUTO: 53.8 %
NRBC # BLD AUTO: 0 10*3/UL
NRBC BLD AUTO-RTO: 0 /100
PLATELET # BLD AUTO: 303 10E9/L (ref 150–450)
RBC # BLD AUTO: 4.85 10E12/L (ref 3.8–5.2)
WBC # BLD AUTO: 5.8 10E9/L (ref 4–11)

## 2020-03-15 PROCEDURE — 85025 COMPLETE CBC W/AUTO DIFF WBC: CPT | Performed by: EMERGENCY MEDICINE

## 2020-03-15 PROCEDURE — 76801 OB US < 14 WKS SINGLE FETUS: CPT

## 2020-03-15 PROCEDURE — 99284 EMERGENCY DEPT VISIT MOD MDM: CPT | Mod: 25

## 2020-03-15 PROCEDURE — 84702 CHORIONIC GONADOTROPIN TEST: CPT | Performed by: EMERGENCY MEDICINE

## 2020-03-15 PROCEDURE — 25000132 ZZH RX MED GY IP 250 OP 250 PS 637: Performed by: EMERGENCY MEDICINE

## 2020-03-15 RX ORDER — ACETAMINOPHEN 500 MG
1000 TABLET ORAL EVERY 4 HOURS PRN
Status: DISCONTINUED | OUTPATIENT
Start: 2020-03-15 | End: 2020-03-15 | Stop reason: HOSPADM

## 2020-03-15 RX ADMIN — ACETAMINOPHEN 1000 MG: 500 TABLET, FILM COATED ORAL at 10:29

## 2020-03-15 ASSESSMENT — ENCOUNTER SYMPTOMS: ABDOMINAL PAIN: 1

## 2020-03-15 NOTE — ED PROVIDER NOTES
History     Chief Complaint:  Vaginal bleeding     A  was used.      Memo Cordova is a 34 year old female, about 12 weeks pregnant, who presents with vaginal bleeding. The patient developed lower abdominal pain and cramping last night around 1700. Around midnight, she noticed some dry blood from her vagina which then developed into fresh blood. She has not needed to use pads but notes slightly staining her underwear and noticing blood on her toilet paper. The pain and bleeding has persisted. Of note, this is the patient's third pregnancy. She had some bleeding in early pregnancy with her first and had preeclampsia and hemorrhaging with her second. The patient's blood type is O positive.    US OB <14 weeks 2/14/2020:  Single viable intrauterine pregnancy with fetal crown-rump length corresponding to estimated gestational age of 7 weeks, 6 days. EDC is 09/26/2020, as per radiology.      Allergies:  Cephalexin     Medications:    Advair  meclizine  nortriptyline   Albuterol  Vaginal metronidazole     Past Medical History:    Asthma  Migraines  PCOS  Hemorrhoids  Iron defiency anemia  GERD    Past Surgical History:    cholecystectomy    Family History:    Father - diabetes, hypertension     Social History:  The patient was accompanied to the ED by her .  Tobacco use: negative   Alcohol use: negative   PCP: [facility]     Review of Systems   Gastrointestinal: Positive for abdominal pain.   Genitourinary: Positive for vaginal bleeding.   All other systems reviewed and are negative.      Physical Exam     Patient Vitals for the past 24 hrs:   BP Temp Pulse Resp SpO2   03/15/20 1030 105/61 -- 76 -- 100 %   03/15/20 1025 -- -- -- -- 99 %   03/15/20 1020 96/66 -- 79 -- 99 %   03/15/20 1011 123/89 97.6  F (36.4  C) 90 16 100 %        Physical Exam  Vital signs and nursing notes reviewed.     Constitutional: laying on gurney appears comfortable  HENT: Oropharynx is clear  and moist  Eyes: Conjunctivae are normal bilaterally. Pupils equal  Neck: normal range of motion  Cardiovascular: Normal rate, regular rhythm, normal heart sounds.   Pulmonary/Chest: Effort normal and breath sounds normal. No respiratory distress.   Abdominal: Soft. Bowel sounds are normal. Mild suprapubic discomfort.  No rebound or guarding.   Musculoskeletal: No joint swelling or edema.   Neurological: Alert and oriented. No focal weakness  Skin: Skin is warm and dry. No rash noted.   Psych: normal affect    Emergency Department Course     Imaging:  Radiology findings were communicated with the patient and family who voiced understanding of the findings.    US 1st trimester w transva. Findings are highly suspicious for embryonic demise.   2. Nonvisualization of the ovaries, as per radiology.      Laboratory:  Laboratory findings were communicated with the patient and family who voiced understanding of the findings.    CBC: WBC 5.8, HGB 11.9,      HCG quantitative pregnancy: 3,845 (H)    Interventions:  1029 Tylenol 1 g PO     Emergency Department Course:  Past medical records, nursing notes, and vitals reviewed.    1018 I performed an exam of the patient as documented above.     IV was inserted and blood was drawn for laboratory testing, results above.  The patient was sent for a 1st trimester ultrasound while in the emergency department, results above.     1207 Patient rechecked and updated.      1245 I consulted with Dr. Pope, OB GYN, regarding the patient's history and presentation here in the emergency department.    1411 I consulted with Dr. Pope, OB GYN, following her visiting the patient on the floor. The patient wishes to go home and follow-up in clinic tomorrow.     I personally reviewed the laboratory and imaging results with the Patient and spouse and answered all related questions prior to discharge.      Impression & Plan     Medical Decision Making:  Memo Cordova is a 34  year old female who presents to the emergency department today with vaginal bleeding and lower abdominal cramping in early pregnancy. Patient had a OB ultrasound approximated at 7 weeks gestation which showed alive intrauterine pregnancy. I reviewed her past labs and showed that she had a positive blood type, therefore she will not require Rhogam. On exam, she had mild suprapubic area discomfort. No other areas of abdominal pain. Her bowel sounds were normal. Lab tests show a diminished HCG, more than I would anticipate with progression of a regular pregnancy. Her hemoglobin was lose to normal range at 11.7 at this time. Pelvic ultrasound was obtained which unfortunately shows findings consistent with fetal demise almost 10 weeks gestation, which is almost 2 weeks behind her dates. At recheck, patient was having no ongoing significant bleeding and her pain was essentially resolved. I discussed with her through the  about the findings and recommendation for OB consultation for possible D&C. Patient was in agreement with the plan and Dr. Loya from [facility] came and evaluated the patient in the Emergency Department.  Dr. Loya discussed options with the patient and recommended D&C.  However patient  does not want to go directly to the OR to have a D&C done at this time. She wants to go home at this time and she will follow-up in clinic tomorrow. Patient is aware though that she may have difficulty passing the fetus at this stage and could cause pain and/ or bleeding. She understands that but was discharged. Patient was advised on reasons to return to the Emergency Department and understands and has no questions.     Discharge Diagnosis:    ICD-10-CM    1. Incomplete miscarriage  O03.4        Disposition:  Discharged to home     Scribe Disclosure:  Marta LUO, am serving as a scribe at 10:18 AM on 3/15/2020 to document services personally performed by Lencho Kirby MD based on my observations and  the provider's statements to me.       Lencho Kirby MD  03/15/20 5048

## 2020-03-15 NOTE — ED AVS SNAPSHOT
[facility]  [address]  [facility] [address]  Phone:  [phone]  Fax:  [phone]                                    Memo Cordova   MRN: [id number]    Department:  [facility]   Date of Visit:  3/15/2020           After Visit Summary Signature Page    I have received my discharge instructions, and my questions have been answered. I have discussed any challenges I see with this plan with the nurse or doctor.    ..........................................................................................................................................  Patient/Patient Representative Signature      ..........................................................................................................................................  Patient Representative Print Name and Relationship to Patient    ..................................................               ................................................  Date                                   Time    ..........................................................................................................................................  Reviewed by Signature/Title    ...................................................              ..............................................  Date                                               Time          22EPIC Rev 08/18

## 2020-03-15 NOTE — DISCHARGE INSTRUCTIONS
Return to the emergency department if severe abdominal pain or very heavy vaginal bleeding.  Specifically bleeding where you are passing large clots, or changing pad every hour for several hours.

## 2020-03-15 NOTE — ED TRIAGE NOTES
Patient presents to the ED reporting cramping and vaginal bleeding that began yesterday. 12 weeks pregnant.

## 2020-03-15 NOTE — CONSULTS
OB/GYN Consult  Memo Cordova   1985  MRN [id number]    CC: bleeding and cramping     Consultation requested by Dr. Kirby,      HPI: Memo Cordova is a 34 year old  who presents with vaginal bleeding and cramping. She is 12+1 weeks GA dated by first trimester ultrasound with JUANA 2020. She notes that she started having vaginal bleeding at around midnight and it was dark brown at this point. She had no passage of tissue or large clots. She did have some additional dark brown discharge since then. She denies shortness of breath/tachycardia. She denies presyncopal symptoms. She was having some constipation this pregnancy, recently was having diarrhea symptoms. She was seen on  3/5 and was diagnosed with bv and started on metrogel.     Ultrasound today showed fetal demise, no fetal heart tones or blood flow seen on ultrasound, fetus measuring 2.2cm. Initial ultrasound was done at 7+6 weeks GA and had normal FHT.     Gyn Hx:  Contraception: none, was not attempting pregnancy  LMP: irregular periods, last sometime in December. Was on intermittent provera C2kldtit to induce bleed. Bleeding was heavy.   OB Hx: , G1-uncomplicated ; G2- , preeclampsia, PPH per report. G3-current pregnancy, had BV, otherwise uncomplicated. Was seeing FP for this pregnancy  Pap Smear: 4/15/2019 NILM, HPV negative  No history of STD per chart.       Past Medical History:   Diagnosis Date     History of pre-eclampsia      Migraines      Uncomplicated asthma      Vitamin D deficiency         Past Surgical History:   Procedure Laterality Date     CHOLECYSTECTOMY       ENDOMETRIAL SAMPLING (BIOPSY)          No current facility-administered medications on file prior to encounter.   fluticasone-salmeterol (ADVAIR) 500-50 MCG/DOSE inhaler, Inhale 1 puff into the lungs every 12 hours  meclizine (ANTIVERT) 25 MG tablet, Take 1 tablet (25 mg) by mouth 3 times daily as needed for  dizziness  nortriptyline (PAMELOR) 75 MG capsule, Take 75 mg by mouth At Bedtime         Allergies   Allergen Reactions     Cephalexin Itching        Social History     Socioeconomic History     Marital status:      Spouse name: Not on file     Number of children: Not on file     Years of education: Not on file     Highest education level: Not on file   Occupational History     Not on file   Social Needs     Financial resource strain: Not on file     Food insecurity     Worry: Not on file     Inability: Not on file     Transportation needs     Medical: Not on file     Non-medical: Not on file   Tobacco Use     Smoking status: Never Smoker     Smokeless tobacco: Never Used   Substance and Sexual Activity     Alcohol use: No     Frequency: Never     Drug use: No     Sexual activity: Not on file   Lifestyle     Physical activity     Days per week: Not on file     Minutes per session: Not on file     Stress: Not on file   Relationships     Social connections     Talks on phone: Not on file     Gets together: Not on file     Attends Sabianist service: Not on file     Active member of club or organization: Not on file     Attends meetings of clubs or organizations: Not on file     Relationship status: Not on file     Intimate partner violence     Fear of current or ex partner: Not on file     Emotionally abused: Not on file     Physically abused: Not on file     Forced sexual activity: Not on file   Other Topics Concern     Not on file   Social History Narrative     Not on file        Objective:  Vitals:    03/15/20 1011 03/15/20 1020 03/15/20 1025 03/15/20 1030   BP: 123/89 96/66  105/61   Pulse: 90 79  76   Resp: 16      Temp: 97.6  F (36.4  C)      SpO2: 100% 99% 99% 100%     Gen: appears well, NAD, cooperative  Heart: RRR with no murmurs noted  Lungs: CTAB, no wheezes, rubs or rhonchi noted. Good air movement throughout and no labored breathing  Abd: Soft, non distended, mildly tender in lower abdomen, no acute  peritoneal signs/rebound tenderness. obese  : deferred, no symptoms currently     Labs/Imaging:  Results for orders placed or performed during the hospital encounter of 03/15/20 (from the past 24 hour(s))   HCG QUANTitative pregnancy (blood)   Result Value Ref Range    HCG Quantitative Serum 3,845 (H) 0 - 5 IU/L   CBC with platelets differential   Result Value Ref Range    WBC 5.8 4.0 - 11.0 10e9/L    RBC Count 4.85 3.8 - 5.2 10e12/L    Hemoglobin 11.9 11.7 - 15.7 g/dL    Hematocrit 40.3 35.0 - 47.0 %    MCV 83 78 - 100 fl    MCH 24.5 (L) 26.5 - 33.0 pg    MCHC 29.5 (L) 31.5 - 36.5 g/dL    RDW 23.6 (H) 10.0 - 15.0 %    Platelet Count 303 150 - 450 10e9/L    Diff Method Automated Method     % Neutrophils 53.8 %    % Lymphocytes 36.5 %    % Monocytes 7.0 %    % Eosinophils 2.2 %    % Basophils 0.3 %    % Immature Granulocytes 0.2 %    Nucleated RBCs 0 0 /100    Absolute Neutrophil 3.1 1.6 - 8.3 10e9/L    Absolute Lymphocytes 2.1 0.8 - 5.3 10e9/L    Absolute Monocytes 0.4 0.0 - 1.3 10e9/L    Absolute Eosinophils 0.1 0.0 - 0.7 10e9/L    Absolute Basophils 0.0 0.0 - 0.2 10e9/L    Abs Immature Granulocytes 0.0 0 - 0.4 10e9/L    Absolute Nucleated RBC 0.0    OB  US 1st trimester w transvag    Narrative    OBSTETRIC ULTRASOUND LESS THAN 14 WEEKS WITH TRANSVAGINAL SINGLE   3/15/2020 11:22 AM    HISTORY: Vaginal bleeding and cramping during early pregnancy.    TECHNIQUE: Transabdominal and transvaginal imaging was performed.   Transvaginal imaging was added to the transabdominal scans.    COMPARISON:  None.    FINDINGS: There is an intrauterine gestational sac containing a yolk  sac and embryo of crown-rump length 2.2 cm, but there is no detectable  embryonic cardiac activity. Findings are highly suspicious for  embryonic demise. Gestational age by crown-rump length is 9 weeks.  Mean sac diameter measures 4.4 cm. The ovaries could not be  visualized, possibly related to overlying bowel gas. No adnexal masses  are  identified. No free fluid within the pelvis. The exam was somewhat  limited related to patient body habitus.      Impression    IMPRESSION:   1. Findings are highly suspicious for embryonic demise.  2. Nonvisualization of the ovaries.    JOSE JOYNER MD       Assessment/Plan: Memo Cordova is a 34 year old  at 12+1 weeks GA d/b 1st tri ultrasound with missed .     MAB:   -discussed findings on ultrasound consistent with MAB due to prior scan with heart tones and this ultrasound WITHOUT fetal heart tones. Expressed condolence.   -discussed options including expectant, medication, surgical management. Discussed higher risk of needing surgical management or having retained POC/severe bleeding with how far she is along this pregnancy.   -discussed recommendation to proceed with surgical management.   -RH+ blood type in system  -vital signs stable, no vaginal bleeding or severe pain at this time, okay to be discharged to home with appropriate precautions. If having increase in vaginal bleeding, signs of anemia, fever or severe pain, will need to return to ER for mgmt. Discussed these warning signs with patient and her  and they expressed understanding.     Pt would like to consider options and be seen in clinic tomorrow for further discussion. Provided patient with [facility] name/number and told about locations of clinics. Discussed that FP does not do suction dilation and curettage and would need to be done with an OBGYN. After further review of chart, has seen Dr. Brigette Dennison (OBGYN) with [facility] if she would prefer to go there for care, okay with this as well.   Will send task to our RN to follow up with patient tomorrow and ensure she establishes care.     Entire visit completed with Slovak video .     Claudine Pope MD  3/15/2020  2:35 PM

## 2020-10-23 ENCOUNTER — NURSE TRIAGE (OUTPATIENT)
Dept: NURSING | Facility: CLINIC | Age: 35
End: 2020-10-23

## 2020-10-24 NOTE — TELEPHONE ENCOUNTER
Patient called requesting , language line , [place] [facility] #[id number].    Patient reports that she just found out she was pregnant and is reporting that her body is itchy all over and she is unable to sleep.     Does not know why she is itchy, has been like this for 3 days and mainly at night.     Has a rash on her body.  By throat there is a rash that comes and goes.      It itches there and all over her body.     Hands, legs, buttocks, vaginal area, feet. Asked patient if she has dry skin, reports a little bit.     Looked up Benadryl in [employer] and reports fetal risk cannot be ruled out.     Advised to see health care provider within 24 hours.     Yaneli Sheets RN/[facility]    COVID 19 Nurse Triage Plan/Patient Instructions    Please be aware that novel coronavirus (COVID-19) may be circulating in the community. If you develop symptoms such as fever, cough, or SOB or if you have concerns about the presence of another infection including coronavirus (COVID-19), please contact your health care provider or visit [email or web address].     Disposition/Instructions    In-Person Visit with provider recommended. Reference Visit Selection Guide.    Thank you for taking steps to prevent the spread of this virus.  o Limit your contact with others.  o Wear a simple mask to cover your cough.  o Wash your hands well and often.    Resources    [facility]: About COVID-19: [email or web address]    CDC: What to Do If You're Sick: www.cdc.gov/coronavirus/2019-ncov/about/steps-when-sick.html    CDC: Ending Home Isolation: www.cdc.gov/coronavirus/2019-ncov/hcp/disposition-in-home-patients.html     CDC: Caring for Someone: www.cdc.gov/coronavirus/2019-ncov/if-you-are-sick/care-for-someone.html     [facility]: Interim Guidance for Hospital Discharge to Home: www.health.[facility].[place]/diseases/coronavirus/hcp/hospdischarge.pdf    [facility] clinical trials (COVID-19 research  studies): clinicalaffairs.[facility]-clinical-trials     Below are the [facility] PlumbID-19 hotlines at the [place] [facility] ([facility]). Interpreters are available.   o For health questions: Call 673-448-0412 or 1-676.285.8273 (7 a.m. to 7 p.m.)  o For questions about schools and childcare: Call 475-716-4136 or 1-404.131.4659 (7 a.m. to 7 p.m.)     Reason for Disposition    [1] Hand or foot  itching AND [2] pregnant    Additional Information    Negative: [1] Life-threatening reaction (anaphylaxis) in the past to similar substance (e.g., food, insect bite/sting, chemical, etc.) AND [2] < 2 hours since exposure    Negative: Difficulty breathing or wheezing    Negative: [1] Difficulty swallowing or slurred speech AND [2] sudden onset    Negative: Sounds like a life-threatening emergency to the triager    Negative: Could be severe allergic reaction    Negative: Insect bites suspected    Negative: Looks like hives (pink bumps with pale centers)    Negative: Yellowish color of the skin AND sclera (white of the eye)    Negative: Itching in just one area or spot    Negative: Widespread rash also present    Negative: Patient sounds very sick or weak to the triager    Negative: [1] MODERATE-SEVERE widespread itching (i.e., interferes with sleep, normal activities or school) AND [2] not improved after 24 hours of itching Care Advice    Negative: [1] MODERATE-SEVERE widespread itching (i.e., interferes with sleep, normal activities or school) AND [2] pregnant    Negative: Taking prescription medication that could cause itching (e.g., codeine/morphine/other opiates, aspirin)    Protocols used: ITCHING - WIDESPREAD-A-

## 2020-11-29 ENCOUNTER — HOSPITAL ENCOUNTER (EMERGENCY)
Facility: CLINIC | Age: 35
Discharge: HOME | End: 2020-11-29
Attending: EMERGENCY MEDICINE | Admitting: EMERGENCY MEDICINE
Payer: MEDICAID

## 2020-11-29 VITALS
HEART RATE: 85 BPM | DIASTOLIC BLOOD PRESSURE: 74 MMHG | RESPIRATION RATE: 20 BRPM | TEMPERATURE: 98.1 F | SYSTOLIC BLOOD PRESSURE: 124 MMHG | OXYGEN SATURATION: 99 %

## 2020-11-29 DIAGNOSIS — R10.12 ABDOMINAL PAIN, LEFT UPPER QUADRANT: ICD-10-CM

## 2020-11-29 LAB
ALBUMIN SERPL-MCNC: 2.8 G/DL (ref 3.4–5)
ALBUMIN UR-MCNC: 10 MG/DL
ALP SERPL-CCNC: 55 U/L (ref 40–150)
ALT SERPL W P-5'-P-CCNC: 11 U/L (ref 0–50)
ANION GAP SERPL CALCULATED.3IONS-SCNC: 3 MMOL/L (ref 3–14)
APPEARANCE UR: ABNORMAL
AST SERPL W P-5'-P-CCNC: 9 U/L (ref 0–45)
BACTERIA #/AREA URNS HPF: ABNORMAL /HPF
BASOPHILS # BLD AUTO: 0 10E9/L (ref 0–0.2)
BASOPHILS NFR BLD AUTO: 0.3 %
BILIRUB SERPL-MCNC: 0.3 MG/DL (ref 0.2–1.3)
BILIRUB UR QL STRIP: NEGATIVE
BUN SERPL-MCNC: 7 MG/DL (ref 7–30)
CALCIUM SERPL-MCNC: 8.7 MG/DL (ref 8.5–10.1)
CHLORIDE SERPL-SCNC: 107 MMOL/L (ref 94–109)
CO2 SERPL-SCNC: 27 MMOL/L (ref 20–32)
COLOR UR AUTO: YELLOW
CREAT SERPL-MCNC: 0.54 MG/DL (ref 0.52–1.04)
DIFFERENTIAL METHOD BLD: NORMAL
EOSINOPHIL # BLD AUTO: 0 10E9/L (ref 0–0.7)
EOSINOPHIL NFR BLD AUTO: 0.6 %
ERYTHROCYTE [DISTWIDTH] IN BLOOD BY AUTOMATED COUNT: 14.7 % (ref 10–15)
GFR SERPL CREATININE-BSD FRML MDRD: >90 ML/MIN/{1.73_M2}
GLUCOSE SERPL-MCNC: 94 MG/DL (ref 70–99)
GLUCOSE UR STRIP-MCNC: NEGATIVE MG/DL
HCT VFR BLD AUTO: 39.9 % (ref 35–47)
HGB BLD-MCNC: 12.9 G/DL (ref 11.7–15.7)
HGB UR QL STRIP: NEGATIVE
IMM GRANULOCYTES # BLD: 0 10E9/L (ref 0–0.4)
IMM GRANULOCYTES NFR BLD: 0.5 %
KETONES UR STRIP-MCNC: NEGATIVE MG/DL
LEUKOCYTE ESTERASE UR QL STRIP: ABNORMAL
LIPASE SERPL-CCNC: 91 U/L (ref 73–393)
LYMPHOCYTES # BLD AUTO: 1.7 10E9/L (ref 0.8–5.3)
LYMPHOCYTES NFR BLD AUTO: 26.7 %
MCH RBC QN AUTO: 29 PG (ref 26.5–33)
MCHC RBC AUTO-ENTMCNC: 32.3 G/DL (ref 31.5–36.5)
MCV RBC AUTO: 90 FL (ref 78–100)
MONOCYTES # BLD AUTO: 0.5 10E9/L (ref 0–1.3)
MONOCYTES NFR BLD AUTO: 7.1 %
MUCOUS THREADS #/AREA URNS LPF: PRESENT /LPF
NEUTROPHILS # BLD AUTO: 4.2 10E9/L (ref 1.6–8.3)
NEUTROPHILS NFR BLD AUTO: 64.8 %
NITRATE UR QL: NEGATIVE
NRBC # BLD AUTO: 0 10*3/UL
NRBC BLD AUTO-RTO: 0 /100
PH UR STRIP: 6.5 PH (ref 5–7)
PLATELET # BLD AUTO: 273 10E9/L (ref 150–450)
POTASSIUM SERPL-SCNC: 3.5 MMOL/L (ref 3.4–5.3)
PROT SERPL-MCNC: 7.2 G/DL (ref 6.8–8.8)
RBC # BLD AUTO: 4.45 10E12/L (ref 3.8–5.2)
RBC #/AREA URNS AUTO: 1 /HPF (ref 0–2)
SODIUM SERPL-SCNC: 137 MMOL/L (ref 133–144)
SOURCE: ABNORMAL
SP GR UR STRIP: 1.02 (ref 1–1.03)
SQUAMOUS #/AREA URNS AUTO: 28 /HPF (ref 0–1)
UROBILINOGEN UR STRIP-MCNC: NORMAL MG/DL (ref 0–2)
WBC # BLD AUTO: 6.5 10E9/L (ref 4–11)
WBC #/AREA URNS AUTO: 8 /HPF (ref 0–5)

## 2020-11-29 PROCEDURE — 99284 EMERGENCY DEPT VISIT MOD MDM: CPT | Mod: 25

## 2020-11-29 PROCEDURE — 80053 COMPREHEN METABOLIC PANEL: CPT | Performed by: EMERGENCY MEDICINE

## 2020-11-29 PROCEDURE — 83690 ASSAY OF LIPASE: CPT | Performed by: EMERGENCY MEDICINE

## 2020-11-29 PROCEDURE — 250N000013 HC RX MED GY IP 250 OP 250 PS 637: Performed by: EMERGENCY MEDICINE

## 2020-11-29 PROCEDURE — 76815 OB US LIMITED FETUS(S): CPT

## 2020-11-29 PROCEDURE — 250N000009 HC RX 250: Performed by: EMERGENCY MEDICINE

## 2020-11-29 PROCEDURE — 85025 COMPLETE CBC W/AUTO DIFF WBC: CPT | Performed by: EMERGENCY MEDICINE

## 2020-11-29 PROCEDURE — 81001 URINALYSIS AUTO W/SCOPE: CPT | Performed by: EMERGENCY MEDICINE

## 2020-11-29 RX ORDER — FAMOTIDINE 20 MG/1
20 TABLET, FILM COATED ORAL 2 TIMES DAILY
Qty: 60 TABLET | Refills: 0 | Status: SHIPPED | OUTPATIENT
Start: 2020-11-29 | End: 2020-12-29

## 2020-11-29 RX ORDER — SUCRALFATE 1 G/1
1 TABLET ORAL 4 TIMES DAILY
Qty: 40 TABLET | Refills: 0 | Status: SHIPPED | OUTPATIENT
Start: 2020-11-29 | End: 2020-12-09

## 2020-11-29 RX ADMIN — LIDOCAINE HYDROCHLORIDE 30 ML: 20 SOLUTION ORAL; TOPICAL at 06:53

## 2020-11-29 ASSESSMENT — ENCOUNTER SYMPTOMS
ABDOMINAL PAIN: 1
HEMATURIA: 0
NAUSEA: 1
DYSURIA: 0
SHORTNESS OF BREATH: 0
CONSTIPATION: 1
DIARRHEA: 0
COUGH: 0
FEVER: 0

## 2020-11-29 NOTE — ED PROVIDER NOTES
History     Chief Complaint:  Abdominal Pain     HPI    Memo Cordova is a 35 year old female who presents with abdominal pain.  Patient is  at 12 weeks who presents with 8-10 weeks of left upper quadrant abdominal pain.  Patient reports that her symptoms have been intermittent up until last night.  Her symptoms became constant resulting in her presentation today.  The pain is sharp, radiates to the back and is aggravated immediately after eating and drinking.  She denies any fevers, chest pain, shortness of breath.  She has mild associated nausea without vomiting.  There has been mild associated constipation.  She denies any lower abdominal pain, vaginal bleeding, discharge or urinary symptoms.  She reports that she did have a OB ultrasound at 9 weeks revealing intrauterine pregnancy.    Allergies:  Topiramate   Cephalexin      Medications:    Advair inhaler  Meclizine  Pamelor      Past Medical History:    Migraines   Asthma  Preeclampsia  Vitamin D deficiency     Past Surgical History:    Cholecystectomy  Endometrial sampling     Family History:    History reviewed. No pertinent family history.      Social History:  Tobacco use:    Never smoker   Alcohol use:    Negative   Drug use:    Negative   Marital status:           Review of Systems   Constitutional: Negative for fever.   Respiratory: Negative for cough and shortness of breath.    Cardiovascular: Negative for chest pain.   Gastrointestinal: Positive for abdominal pain, constipation and nausea. Negative for diarrhea.   Genitourinary: Negative for dysuria, hematuria, vaginal bleeding and vaginal discharge.   All other systems reviewed and are negative.      Physical Exam   First Vitals:  BP: (!) 136/99  Pulse: 99  Temp: 98.1  F (36.7  C)  Resp: 20  SpO2: 99 %      Physical Exam    Constitutional:  Pleasant, age appropriate.       Resting comfortably in the bed.  Eyes:    Conjunctiva normal  Neck:    Supple, no meningismus.     CV:      Regular rate and rhythm.      No murmurs, rubs or gallops.     No lower extremity edema.  PULM:    Clear to auscultation bilateral.       No respiratory distress.      Good air exchange.     No rales or wheezing.  ABD:    Soft, non-distended.       Mild focal tenderness in the LUQ.     Bowel sounds normal.     No pulsatile masses.       No rebound, guarding or rigidity.     No CVA tenderness.   MSK:     No gross deformity to all four extremities.   LYMPH:   No cervical lymphadenopathy.  NEURO:   Alert.  Good muscular tone, no atrophy.   Skin:    Warm, dry and intact.    Psych:    Mood is good and affect is appropriate.      Emergency Department Course      Laboratory:  CBC: WNL (WBC 6.5, HGB 12.9, )   CMP: Albumin 2.8 low, o/w WNL (Creatinine 0.54)    Lipase: 91   Routine UA with Microscopic: Protein albumin 10, Small leukocyte esterase, WBC 8 high, Few bacteria, Squamous epithelial 28 high, Mucous present, o/w Negative     Procedures:  Results for orders placed during the hospital encounter of 11/29/20   POC US OB TRANSABDOMINAL LIMITED    Impression [facility] Procedure Note     Limited Bedside ED Pelvic Ultrasound (PREGNANT PATIENT):    PROCEDURE: PERFORMED BY: Dr. Maverick Muir MD  INDICATIONS/SYMPTOM: Abdominal Pain  PROBE: Low frequency convex probe  Type of US Study: Transabdominal Exam  BODY LOCATION: Pelvis  FINDINGS: Fetal heart rate: Present  INTERPRETATION: Extremely limited limited transabdominal ultrasound secondary to body habitus.  Intrauterine intrauterine pregnancy noted with heartbeat but unable unable to definitively perform heart rate calculation or further evaluation of the adnexa.  IMAGE DOCUMENTATION: Images were archived to PACs system.        Interventions:  0653 GI cocktail 30 mL PO      Emergency Department Course:  Nursing notes and vitals reviewed.  0635: I performed an exam of the patient as documented above.     0724:  A POC US was performed as outlined in the  procedure note above.  The patient tolerated well and there were no complications.     I personally reviewed the laboratory results with the patient and answered all related questions prior to discharge.     Repeat /74.    Findings and plan explained to the Patient. Patient discharged home with instructions regarding supportive care, medications, and reasons to return. The importance of close follow-up was reviewed. The patient was prescribed Pepcid and Carafate.      Impression & Plan       Medical Decision Makin-year-old female  at 12 weeks who presents to the ED with 8 to 10 weeks of left upper quadrant abdominal pain.  Basic laboratory studies were reassuring.  Urinalysis is contaminated but no symptoms to suggest UTI.  History and exam most consistent with GERD.  Patient had complete resolution of discomfort with GI cocktail.  She is safe for discharge home with initiation of famotidine and sucralfate.  Close follow-up with OB/GYN physician to ensure improvement and blood pressure recheck.    Patient did have a previous ultrasound at 9 weeks revealing uncomplicated intrauterine pregnancy.  She was concerned about viability of pregnancy.  Bedside transabdominal views revealed IUP with heartbeat and spontaneous fetal movement.  Otherwise ultrasound was very limited due to body habitus.  Given her prior ultrasound, no indication today for transvaginal approach or formal ultrasound as this was done to reassure patient.    Diagnosis:    ICD-10-CM    1. Abdominal pain, left upper quadrant  R10.12       Disposition:  Discharged to home with Pepcid and Carafate.      Discharge Medications:  New Prescriptions    FAMOTIDINE (PEPCID) 20 MG TABLET    Take 1 tablet (20 mg) by mouth 2 times daily    SUCRALFATE (CARAFATE) 1 GM TABLET    Take 1 tablet (1 g) by mouth 4 times daily for 10 days       Jake LUO am serving as a scribe at 6:35 AM on 2020 to document services personally performed by  Dr. Muir, based on my observations and the provider's statements to me.     [facility] EMERGENCY DEPT       Maverick Muir MD  11/29/20 0754

## 2020-11-29 NOTE — ED AVS SNAPSHOT
[facility]  [address]  [facility] [address]  Phone: [phone]  Fax: [phone]                                    Memo Cordova   MRN: [id number]    Department: [facility]   Date of Visit: 11/29/2020           After Visit Summary Signature Page    I have received my discharge instructions, and my questions have been answered. I have discussed any challenges I see with this plan with the nurse or doctor.    ..........................................................................................................................................  Patient/Patient Representative Signature      ..........................................................................................................................................  Patient Representative Print Name and Relationship to Patient    ..................................................               ................................................  Date                                   Time    ..........................................................................................................................................  Reviewed by Signature/Title    ...................................................              ..............................................  Date                                               Time          22EPIC Rev 08/18

## 2020-11-29 NOTE — ED TRIAGE NOTES
Pt states left sided abdominal pain for over 1 day. Pt states approximately 12 weeks gestation. Pt denies dysuria or vaginal bleeding. ABCs intact GCS 15

## 2020-11-29 NOTE — DISCHARGE INSTRUCTIONS
Please follow-up with your OB/GYN doctor in 1 week.  You will need a recheck of your blood pressure as it was mildly elevated today.

## 2025-07-19 ENCOUNTER — HOSPITAL ENCOUNTER (EMERGENCY)
Facility: CLINIC | Age: 40
End: 2025-07-19
Payer: MEDICAID